# Patient Record
Sex: FEMALE | Race: BLACK OR AFRICAN AMERICAN | Employment: PART TIME | URBAN - METROPOLITAN AREA
[De-identification: names, ages, dates, MRNs, and addresses within clinical notes are randomized per-mention and may not be internally consistent; named-entity substitution may affect disease eponyms.]

---

## 2018-07-19 ENCOUNTER — ROUTINE PRENATAL (OUTPATIENT)
Dept: OBGYN CLINIC | Age: 23
End: 2018-07-19

## 2018-07-19 VITALS
BODY MASS INDEX: 24.75 KG/M2 | SYSTOLIC BLOOD PRESSURE: 92 MMHG | WEIGHT: 154 LBS | HEIGHT: 66 IN | DIASTOLIC BLOOD PRESSURE: 60 MMHG

## 2018-07-19 DIAGNOSIS — Z34.82 ENCOUNTER FOR SUPERVISION OF OTHER NORMAL PREGNANCY, SECOND TRIMESTER: ICD-10-CM

## 2018-07-19 DIAGNOSIS — N92.6 MISSED MENSES: ICD-10-CM

## 2018-07-19 DIAGNOSIS — Z34.02 ENCOUNTER FOR PRENATAL CARE IN SECOND TRIMESTER OF FIRST PREGNANCY: Primary | ICD-10-CM

## 2018-07-19 LAB
AFPT, MATERNAL, EXTERNAL: NEGATIVE
ANTIBODY SCREEN, EXTERNAL: NEGATIVE
CHLAMYDIA, EXTERNAL: NEGATIVE
HBSAG, EXTERNAL: NEGATIVE
HCT, EXTERNAL: 37.9
HGB, EXTERNAL: 12.9
HIV, EXTERNAL: NEGATIVE
N. GONORRHEA, EXTERNAL: NEGATIVE
PLATELET CNT,   EXTERNAL: 253
RUBELLA, EXTERNAL: 3.01
T. PALLIDUM, EXTERNAL: NEGATIVE
TYPE, ABO & RH, EXTERNAL: NORMAL
URINALYSIS, EXTERNAL: NEGATIVE

## 2018-07-19 NOTE — PROGRESS NOTES
Current pregnancy history:    Dorian Gee is a 25 y.o. female who presents for the evaluation of pregnancy. Patient's last menstrual period was 2018 (approximate). LMP history:  The date of her LMP is almost certain. Her last menstrual period was normal and lasted for 4 to 5 days. A urine pregnancy test was positive in April. She was not on the pill at conception. Based on her LMP, her EDC is 19 and her EGA is 15 weeks, 5 days. Her menstrual cycles are regular and occur approximately every 28 days  and range from 3 to 5 days. The last menses lasted the usual number of days. Ultrasound data:  She had an  ultrasound done by the ultrasound tech today which revealed a viable schreiber pregnancy with a gestational age of 14 weeks and 5 days giving an EDC of 19. Pregnancy symptoms:    Since her LMP she has experienced  urinary frequency, breast tenderness, and nausea. She has not been vomiting over the last few weeks. Associated signs and symptoms which she denies: dysuria, discharge, vaginal bleeding. She states she has gained weight:  Approximately a few pounds over the last few weeks. Relevant past pregnancy history:   She has the following pregnancy history Her last pregnancy was uncomplicated . She has no history of  delivery. Relevant past medical history:(relevant to this pregnancy): noncontributory. Pap/Occupational history:  Last pap smear: 2018 performed at [address] [place] [address] Results: Normal      Her occupation is: CNA. Substance history: negative for alcohol, tobacco and street drugs. Positive for nothing. Exposure history: There is/are no indoor cat/s in the home. The patient was instructed to not change the cat litter. She admits close contact with children on a regular basis. She has had chicken pox or the vaccine in the past.   Patient denies issues with domestic violence.      Genetic Screening/Teratology Counseling: (Includes patient, baby's father, or anyone in either family with:)  3.  Patient's age >/= 28 at [facility]?-- no  .   2. Thalassemia ([facility], [place], [address], or  background): MCV<80?--no.     3.  Neural tube defect (meningomyelocele, spina bifida, anencephaly)?--no.   4.  Congenital heart defect?--no.  5.  Down syndrome?--no.   6.  Sterling-Sachs ([place], [place])?--no.   7.  Canavan's Disease?--no.   8.  Familial Dysautonomia?--no.   9.  Sickle cell disease or trait ()? --no   The patient has not been tested for sickle trait  10. Hemophilia or other blood disorders?--no. 11.  Muscular dystrophy?--no. 12.  Cystic fibrosis?--no. 13.  Menominee's Chorea?--no. 14.  Mental retardation/autism (if yes was person tested for Fragile X)?--no. 15.  Other inherited genetic or chromosomal disorder?--no. 12.  Maternal metabolic disorder (DM, PKU, etc)?--no. 17.  Patient or FOB with a child with a birth defect not listed above?--no.  17a. Patient or FOB with a birth defect themselves?--no. 18.  Recurrent pregnancy loss, or stillbirth?--no. 19.  Any medications since LMP other than prenatal vitamins (include vitamins,  supplements, OTC meds, drugs, alcohol)?--no. 20.  Any other genetic/environmental exposure to discuss?--no. Infection History:  1. Lives with someone with TB or TB exposed?--no.   2.  Patient or partner has history of genital herpes?--no.  3.  Rash or viral illness since LMP?--no.    4.  History of STD (GC, CT, HPV, syphilis, HIV)? --no   5. Other: OTHER? Past Medical History:   Diagnosis Date    Asthma     Nerve damage     Psychiatric disorder     anxiety     Past Surgical History:   Procedure Laterality Date    HX OTHER SURGICAL      keloid x3 removal on ear     Social History     Occupational History    Not on file.      Social History Main Topics    Smoking status: Never Smoker    Smokeless tobacco: Never Used    Alcohol use No    Drug use: No    Sexual activity: Yes     Partners: Male     Birth control/ protection: None     History reviewed. No pertinent family history. No Known Allergies  Prior to Admission medications    Medication Sig Start Date End Date Taking? Authorizing Provider   HYDROcodone-acetaminophen (NORCO) 5-325 mg per tablet Take 1-2 Tabs by mouth every four (4) hours as needed for Pain. Max Daily Amount: 12 Tabs. 9/28/16   Giovani Meneses MD   ondansetron (ZOFRAN ODT) 4 mg disintegrating tablet Take 1 Tab by mouth every four (4) hours as needed for Nausea. 9/28/16   Giovani Meneses MD   methylPREDNISolone (MEDROL DOSEPACK) 4 mg tablet As directed 9/28/16   Giovani Meneses MD   FLUoxetine (PROZAC) 20 mg capsule Take 20 mg by mouth daily. Historical Provider   loratadine 10 mg Cap Take  by mouth daily.  10 mg daily     Phys MD Gallo        Review of Systems: History obtained from the patient  Constitutional: negative for weight loss, fever, night sweats  HEENT: negative for hearing loss, earache, congestion, snoring, sorethroat  CV: negative for chest pain, palpitations, edema  Resp: negative for cough, shortness of breath, wheezing  Breast: negative for breast lumps, nipple discharge, galactorrhea  GI: negative for change in bowel habits, abdominal pain, black or bloody stools  : negative for frequency, dysuria, hematuria, vaginal discharge  MSK: negative for back pain, joint pain, muscle pain  Skin: negative for itching, rash, hives  Neuro: negative for dizziness, headache, confusion, weakness  Psych: negative for anxiety, depression, change in mood  Heme/lymph: negative for bleeding, bruising, pallor    Objective:  Visit Vitals    BP 92/60    Ht 5' 6\" (1.676 m)    Wt 154 lb (69.9 kg)    LMP 03/31/2018 (Approximate)    BMI 24.86 kg/m2       Physical Exam:   PHYSICAL EXAMINATION    Constitutional  · Appearance: well-nourished, well developed, alert, in no acute distress    HENT  · Head  · Face: appears normal  · Eyes: appear normal  · Ears: normal  · Mouth: normal  · Lips: no lesions    Neck  · Inspection/Palpation: normal appearance, no masses or tenderness  · Lymph Nodes: no lymphadenopathy present  · Thyroid: gland size normal, nontender, no nodules or masses present on palpation    Chest  · Respiratory Effort: breathing unlabored  · Auscultation: normal breath sounds    Cardiovascular  · Heart:  · Auscultation: regular rate and rhythm without murmur    Breasts  · Inspection of Breasts: breasts symmetrical, no skin changes, no discharge present, nipple appearance normal, no skin retraction present  · Palpation of Breasts and Axillae: no masses present on palpation, no breast tenderness  · Axillary Lymph Nodes: no lymphadenopathy present    Gastrointestinal  · Abdominal Examination: abdomen non-tender to palpation, normal bowel sounds, no masses present  · Liver and spleen: no hepatomegaly present, spleen not palpable  · Hernias: no hernias identified    Genitourinary  · External Genitalia: normal appearance for age, no discharge present, no tenderness present, no inflammatory lesions present, no masses present, no atrophy present  · Vagina: normal vaginal vault without central or paravaginal defects, no discharge present, no inflammatory lesions present, no masses present  · Bladder: non-tender to palpation  · Urethra: appears normal  · Cervix: normal   · Uterus: enlarged to 16 week size, normal shape, soft  · Adnexa: no adnexal tenderness present, no adnexal masses present  · Perineum: perineum within normal limits, no evidence of trauma, no rashes or skin lesions present  · Anus: anus within normal limits, no hemorrhoids present  · Inguinal Lymph Nodes: no lymphadenopathy present    Skin  · General Inspection: no rash, no lesions identified    Neurologic/Psychiatric  · Mental Status:  · Orientation: grossly oriented to person, place and time  · Mood and Affect: mood normal, affect appropriate    Assessment:   Intrauterine pregnancy with the following problems identified: Lived in [facility] for 4 months during this pregnancy--no illness. Plan:     Offered CF testing, CVS, Nuchal Translucency, MSAFP, amnio  Course of pregnancy discussed including visit schedule, routine U/S, glucola testing, etc.  Avoid alcoholic beverages and illicit/recreational drugs use  Take prenatal vitamins or folic acid daily. Hospital and practice style discussed with coverage system. Discussed nutrition, toxoplasmosis precautions, sexual activity, exercise, need for influenza vaccine, environmental and work hazards, travel advice, screen for domestic violence, need for seat belts. Discussed seafood, unpasteurized dairy products, deli meat, artificial sweeteners, and caffeine. Information on prenatal classes/breastfeeding given. Information on circumcision given  Patient encouraged not to smoke. Discussed current prescription drug use. Given medication list.  Discussed the use of over the counter medications and chemicals. Route of delivery discussed, including risks, benefits, and alternatives of  versus repeat LTCS. Pt understands risk of hemorrhage during pregnancy and post delivery and would accept blood products if necessary in life-threatening emergencies      Handouts given to pt.

## 2018-07-21 LAB — BACTERIA UR CULT: NO GROWTH

## 2018-07-23 ENCOUNTER — TELEPHONE (OUTPATIENT)
Dept: OBGYN CLINIC | Age: 23
End: 2018-07-23

## 2018-07-23 RX ORDER — ONDANSETRON 8 MG/1
8 TABLET, ORALLY DISINTEGRATING ORAL
Qty: 30 TAB | Refills: 2 | Status: SHIPPED | OUTPATIENT
Start: 2018-07-23 | End: 2018-12-28

## 2018-07-23 NOTE — TELEPHONE ENCOUNTER
Patient left VM on OMAR Triage line requesting a rx for nausea and vomiting in pregnancy. Patient is  , 16w2d  Ob patient. Pharmacy is correct. Please advise.

## 2018-07-24 LAB
C TRACH RRNA SPEC QL NAA+PROBE: NEGATIVE
N GONORRHOEA RRNA SPEC QL NAA+PROBE: NEGATIVE
T VAGINALIS RRNA SPEC QL NAA+PROBE: NEGATIVE

## 2018-08-01 LAB
2ND TRIMESTER 4 SCREEN SERPL-IMP: NORMAL
2ND TRIMESTER 4 SCREEN SERPL-IMP: NORMAL
ABO GROUP BLD: NORMAL
AFP ADJ MOM SERPL: 0.73
AFP SERPL-MCNC: 25.8 NG/ML
AGE AT DELIVERY: 22.3 YR
BLD GP AB SCN SERPL QL: NEGATIVE
COMMENTS, 018014: NORMAL
ERYTHROCYTE [DISTWIDTH] IN BLOOD BY AUTOMATED COUNT: 14 % (ref 12.3–15.4)
FET TS 18 RISK FROM MAT AGE: NORMAL
FET TS 21 RISK FROM MAT AGE: 1121
GA METHOD: NORMAL
GA: 16 WEEKS
HBV SURFACE AG SERPL QL IA: NEGATIVE
HCG ADJ MOM SERPL: 0.85
HCG SERPL-ACNC: NORMAL MIU/ML
HCT VFR BLD AUTO: 37.9 % (ref 34–46.6)
HGB BLD-MCNC: 12.9 G/DL (ref 11.1–15.9)
HIV 1+2 AB+HIV1 P24 AG SERPL QL IA: NON REACTIVE
IDDM PATIENT QL: NO
INHIBIN A ADJ MOM SERPL: 0.9
INHIBIN A SERPL-MCNC: 150.65 PG/ML
MCH RBC QN AUTO: 31.1 PG (ref 26.6–33)
MCHC RBC AUTO-ENTMCNC: 34 G/DL (ref 31.5–35.7)
MCV RBC AUTO: 91 FL (ref 79–97)
MULTIPLE PREGNANCY: NO
NEURAL TUBE DEFECT RISK FETUS: NORMAL %
PLATELET # BLD AUTO: 253 X10E3/UL (ref 150–379)
RBC # BLD AUTO: 4.15 X10E6/UL (ref 3.77–5.28)
RESULTS, 017389: NORMAL
RH BLD: POSITIVE
RUBV IGG SERPL IA-ACNC: 3.01 INDEX
T PALLIDUM AB SER QL IA: NEGATIVE
TS 18 RISK FETUS: NORMAL
TS 21 RISK FETUS: 6686
U ESTRIOL ADJ MOM SERPL: 0.97
U ESTRIOL SERPL-MCNC: 0.74 NG/ML
WBC # BLD AUTO: 8.6 X10E3/UL (ref 3.4–10.8)

## 2018-08-02 DIAGNOSIS — Z34.82 ENCOUNTER FOR SUPERVISION OF OTHER NORMAL PREGNANCY, SECOND TRIMESTER: ICD-10-CM

## 2018-08-14 ENCOUNTER — ROUTINE PRENATAL (OUTPATIENT)
Dept: OBGYN CLINIC | Age: 23
End: 2018-08-14

## 2018-08-14 VITALS
SYSTOLIC BLOOD PRESSURE: 100 MMHG | WEIGHT: 158 LBS | DIASTOLIC BLOOD PRESSURE: 60 MMHG | HEIGHT: 66 IN | BODY MASS INDEX: 25.39 KG/M2

## 2018-08-14 DIAGNOSIS — Z34.82 ENCOUNTER FOR SUPERVISION OF OTHER NORMAL PREGNANCY, SECOND TRIMESTER: Primary | ICD-10-CM

## 2018-09-11 ENCOUNTER — ROUTINE PRENATAL (OUTPATIENT)
Dept: OBGYN CLINIC | Age: 23
End: 2018-09-11

## 2018-09-11 VITALS
SYSTOLIC BLOOD PRESSURE: 98 MMHG | WEIGHT: 163 LBS | DIASTOLIC BLOOD PRESSURE: 60 MMHG | HEIGHT: 66 IN | BODY MASS INDEX: 26.2 KG/M2

## 2018-09-11 DIAGNOSIS — Z34.82 ENCOUNTER FOR SUPERVISION OF OTHER NORMAL PREGNANCY, SECOND TRIMESTER: Primary | ICD-10-CM

## 2018-09-11 NOTE — PATIENT INSTRUCTIONS
Weeks 22 to 26 of Your Pregnancy: Care Instructions  Your Care Instructions    As you enter your 7th month of pregnancy at week 26, your baby's lungs are growing stronger and getting ready to breathe. You may notice that your baby responds to the sound of your or your partner's voice. You may also notice that your baby does less turning and twisting and more squirming or jerking. Jerking often means that your baby has the hiccups. Hiccups are perfectly normal and are only temporary. You may want to think about attending a childbirth preparation class. This is also a good time to start thinking about whether you want to have pain medicine during labor. Most pregnant women are tested for gestational diabetes between weeks 25 and 28. Gestational diabetes occurs when your blood sugar level gets too high when you're pregnant. The test is important, because you can have gestational diabetes and not know it. But the condition can cause problems for your baby. Follow-up care is a key part of your treatment and safety. Be sure to make and go to all appointments, and call your doctor if you are having problems. It's also a good idea to know your test results and keep a list of the medicines you take. How can you care for yourself at home? Ease discomfort from your baby's kicking  · Change your position. Sometimes this will cause your baby to change position too. · Take a deep breath while you raise your arm over your head. Then breathe out while you drop your arm. Do Kegel exercises to prevent urine from leaking  · You can do Kegel exercises while you stand or sit. ¨ Squeeze the same muscles you would use to stop your urine. Your belly and thighs should not move. ¨ Hold the squeeze for 3 seconds, and then relax for 3 seconds. ¨ Start with 3 seconds. Then add 1 second each week until you are able to squeeze for 10 seconds. ¨ Repeat the exercise 10 to 15 times for each session.  Do three or more sessions each day.  Ease or reduce swelling in your feet, ankles, hands, and fingers  · If your fingers are puffy, take off your rings. · Do not eat high-salt foods, such as potato chips. · Prop up your feet on a stool or couch as much as possible. Sleep with pillows under your feet. · Do not stand for long periods of time or wear tight shoes. · Wear support stockings. Where can you learn more? Go to http://alberto-brady.info/. Enter G264 in the search box to learn more about \"Weeks 22 to 26 of Your Pregnancy: Care Instructions. \"  Current as of: November 21, 2017  Content Version: 11.7  © 9164-9907 [employer]. Care instructions adapted under license by [employer] (which disclaims liability or warranty for this information). If you have questions about a medical condition or this instruction, always ask your healthcare professional. [place] [address] any warranty or liability for your use of this information.

## 2018-09-25 ENCOUNTER — ROUTINE PRENATAL (OUTPATIENT)
Dept: OBGYN CLINIC | Age: 23
End: 2018-09-25

## 2018-09-25 VITALS
DIASTOLIC BLOOD PRESSURE: 60 MMHG | BODY MASS INDEX: 26.03 KG/M2 | HEIGHT: 66 IN | SYSTOLIC BLOOD PRESSURE: 100 MMHG | WEIGHT: 162 LBS

## 2018-09-25 DIAGNOSIS — R30.0 BURNING WITH URINATION: ICD-10-CM

## 2018-09-25 DIAGNOSIS — Z34.82 ENCOUNTER FOR SUPERVISION OF OTHER NORMAL PREGNANCY, SECOND TRIMESTER: Primary | ICD-10-CM

## 2018-09-25 LAB
BILIRUB UR QL STRIP: NEGATIVE
GLUCOSE UR-MCNC: NEGATIVE MG/DL
KETONES P FAST UR STRIP-MCNC: NEGATIVE MG/DL
PH UR STRIP: 7 [PH] (ref 4.6–8)
PROT UR QL STRIP: NEGATIVE
SP GR UR STRIP: 1 (ref 1–1.03)
UA UROBILINOGEN AMB POC: NORMAL (ref 0.2–1)
URINALYSIS CLARITY POC: NORMAL
URINALYSIS COLOR POC: NORMAL
URINE BLOOD POC: NEGATIVE
URINE LEUKOCYTES POC: NEGATIVE
URINE NITRITES POC: NEGATIVE

## 2018-09-25 RX ORDER — NITROFURANTOIN 25; 75 MG/1; MG/1
100 CAPSULE ORAL 2 TIMES DAILY
Qty: 14 CAP | Refills: 0 | Status: SHIPPED | OUTPATIENT
Start: 2018-09-25 | End: 2018-12-28

## 2018-09-25 NOTE — PROGRESS NOTES
UTI note    Misael Rivera is a ,  26 y.o. female [address] who presents today with had concerns including UTI. Heather Nicolas Her symptoms started a few days ago, unchanged since that time. Discomfort is in the suprapubic area and does not radiate. Symptoms are not alleviated by hydration. Symptoms are exacerbated with activity. Patient's other complaints: back pain and urinary frequency with burning. Her symptoms are moderate. Patient denies fever and vaginal discharge. There is not any concern of sexual abuse. There is not a history of trauma to the genital area. Patient does not have a history of recurrent UTI. She does not have a history of pyelonephritis. She has a history of  has a past medical history of Asthma and Psychiatric disorder. with the following surgical history  has a past surgical history that includes hx other surgical..  . She has not been evaluated for her current complaints. Urine dipstick shows negative for all components. Also having reflux. No results found for this or any previous visit. Past Medical History:   Diagnosis Date    Asthma     Psychiatric disorder     anxiety     Past Surgical History:   Procedure Laterality Date    HX OTHER SURGICAL      keloid x3 removal on ear     Social History     Occupational History    Not on file. Social History Main Topics    Smoking status: Never Smoker    Smokeless tobacco: Never Used    Alcohol use No    Drug use: No    Sexual activity: Yes     Partners: Male     Birth control/ protection: None     History reviewed. No pertinent family history. No Known Allergies  Prior to Admission medications    Medication Sig Start Date End Date Taking? Authorizing Provider   ondansetron (ZOFRAN ODT) 8 mg disintegrating tablet Take 1 Tab by mouth every eight (8) hours as needed for Nausea.  18   Gus Rock MD   HYDROcodone-acetaminophen [facility]) 5-325 mg per tablet Take 1-2 Tabs by mouth every four (4) hours as needed for Pain. Max Daily Amount: 12 Tabs. 9/28/16   Ledy Tinsley MD   ondansetron (ZOFRAN ODT) 4 mg disintegrating tablet Take 1 Tab by mouth every four (4) hours as needed for Nausea. 9/28/16   Ledy Tinsley MD   methylPREDNISolone (MEDROL DOSEPACK) 4 mg tablet As directed 9/28/16   Ledy Tinsley MD   FLUoxetine (PROZAC) 20 mg capsule Take 20 mg by mouth daily. Historical Provider   loratadine 10 mg Cap Take  by mouth daily.  10 mg daily     Phys MD Gallo        Review of Systems: History obtained from the patient  Constitutional: negative for weight loss, fever, night sweats  Breast: negative for breast lumps, nipple discharge, galactorrhea  GI: negative for change in bowel habits, abdominal pain, black or bloody stools  : see HPI, negative for vaginal discharge  MSK: negative for back pain, joint pain, muscle pain  Skin: negative for itching, rash, hives  Psych: negative for anxiety, depression, change in mood      Objective:  Visit Vitals    /60    Ht 5' 6\" (1.676 m)    Wt 162 lb (73.5 kg)    LMP 03/31/2018 (Approximate)    BMI 26.15 kg/m2       Physical Exam:   PHYSICAL EXAMINATION    Constitutional  · Appearance: well-nourished, well developed, alert, in no acute distress  · Back: negative for CVAT    Gastrointestinal  · Abdominal Examination: abdomen non-tender to palpation, normal bowel sounds, no masses present  · Liver and spleen: no hepatomegaly present, spleen not palpable  · Hernias: no hernias identified    Skin  · General Inspection: no rash, no lesions identified    Neurologic/Psychiatric  · Mental Status:  · Orientation: grossly oriented to person, place and time  · Mood and Affect: mood normal, affect appropriate    Assessment:   UTI by symptoms  Reflux    Plan:   Rx: Macrobid, call if culture shows not sensitive  Rx: Zantac 150 bid  Glucose, TDAP, Flu, consent next appt

## 2018-09-26 LAB — BACTERIA UR CULT: NO GROWTH

## 2018-09-28 ENCOUNTER — HOSPITAL ENCOUNTER (EMERGENCY)
Age: 23
Discharge: HOME | End: 2018-09-29
Attending: EMERGENCY MEDICINE
Payer: MEDICAID

## 2018-09-28 DIAGNOSIS — T78.40XA ALLERGIC REACTION, INITIAL ENCOUNTER: Primary | ICD-10-CM

## 2018-09-28 DIAGNOSIS — Z3A.26 PREGNANCY WITH 26 COMPLETED WEEKS GESTATION: ICD-10-CM

## 2018-09-28 PROCEDURE — 81001 URINALYSIS AUTO W/SCOPE: CPT | Performed by: NURSE PRACTITIONER

## 2018-09-28 PROCEDURE — 96361 HYDRATE IV INFUSION ADD-ON: CPT

## 2018-09-28 PROCEDURE — 99283 EMERGENCY DEPT VISIT LOW MDM: CPT

## 2018-09-28 PROCEDURE — 85025 COMPLETE CBC W/AUTO DIFF WBC: CPT | Performed by: NURSE PRACTITIONER

## 2018-09-28 PROCEDURE — 36415 COLL VENOUS BLD VENIPUNCTURE: CPT | Performed by: NURSE PRACTITIONER

## 2018-09-28 PROCEDURE — 74011250636 HC RX REV CODE- 250/636: Performed by: NURSE PRACTITIONER

## 2018-09-28 PROCEDURE — 80053 COMPREHEN METABOLIC PANEL: CPT | Performed by: NURSE PRACTITIONER

## 2018-09-28 PROCEDURE — 74011636637 HC RX REV CODE- 636/637: Performed by: NURSE PRACTITIONER

## 2018-09-28 PROCEDURE — 96374 THER/PROPH/DIAG INJ IV PUSH: CPT

## 2018-09-28 RX ORDER — DIPHENHYDRAMINE HYDROCHLORIDE 50 MG/ML
12.5 INJECTION, SOLUTION INTRAMUSCULAR; INTRAVENOUS
Status: COMPLETED | OUTPATIENT
Start: 2018-09-28 | End: 2018-09-28

## 2018-09-28 RX ORDER — PREDNISONE 20 MG/1
60 TABLET ORAL
Status: COMPLETED | OUTPATIENT
Start: 2018-09-28 | End: 2018-09-28

## 2018-09-28 RX ADMIN — DIPHENHYDRAMINE HYDROCHLORIDE 12.5 MG: 50 INJECTION, SOLUTION INTRAMUSCULAR; INTRAVENOUS at 23:47

## 2018-09-28 RX ADMIN — PREDNISONE 60 MG: 20 TABLET ORAL at 23:47

## 2018-09-28 RX ADMIN — SODIUM CHLORIDE 1000 ML: 900 INJECTION, SOLUTION INTRAVENOUS at 23:47

## 2018-09-29 VITALS
TEMPERATURE: 98.8 F | WEIGHT: 163 LBS | OXYGEN SATURATION: 100 % | BODY MASS INDEX: 27.16 KG/M2 | RESPIRATION RATE: 20 BRPM | HEIGHT: 65 IN | HEART RATE: 95 BPM | SYSTOLIC BLOOD PRESSURE: 98 MMHG | DIASTOLIC BLOOD PRESSURE: 51 MMHG

## 2018-09-29 LAB
ALBUMIN SERPL-MCNC: 3 G/DL (ref 3.5–5)
ALBUMIN/GLOB SERPL: 0.8 {RATIO} (ref 1.1–2.2)
ALP SERPL-CCNC: 46 U/L (ref 45–117)
ALT SERPL-CCNC: 21 U/L (ref 12–78)
ANION GAP SERPL CALC-SCNC: 9 MMOL/L (ref 5–15)
APPEARANCE UR: CLEAR
AST SERPL-CCNC: 20 U/L (ref 15–37)
BACTERIA URNS QL MICRO: NEGATIVE /HPF
BASOPHILS # BLD: 0 K/UL (ref 0–0.1)
BASOPHILS NFR BLD: 0 % (ref 0–1)
BILIRUB SERPL-MCNC: 0.1 MG/DL (ref 0.2–1)
BILIRUB UR QL: NEGATIVE
BUN SERPL-MCNC: 10 MG/DL (ref 6–20)
BUN/CREAT SERPL: 19 (ref 12–20)
CALCIUM SERPL-MCNC: 8.9 MG/DL (ref 8.5–10.1)
CHLORIDE SERPL-SCNC: 103 MMOL/L (ref 97–108)
CO2 SERPL-SCNC: 26 MMOL/L (ref 21–32)
COLOR UR: ABNORMAL
CREAT SERPL-MCNC: 0.52 MG/DL (ref 0.55–1.02)
DIFFERENTIAL METHOD BLD: ABNORMAL
EOSINOPHIL # BLD: 0.3 K/UL (ref 0–0.4)
EOSINOPHIL NFR BLD: 5 % (ref 0–7)
EPITH CASTS URNS QL MICRO: ABNORMAL /LPF
ERYTHROCYTE [DISTWIDTH] IN BLOOD BY AUTOMATED COUNT: 12.9 % (ref 11.5–14.5)
GLOBULIN SER CALC-MCNC: 3.7 G/DL (ref 2–4)
GLUCOSE SERPL-MCNC: 96 MG/DL (ref 65–100)
GLUCOSE UR STRIP.AUTO-MCNC: NEGATIVE MG/DL
HCT VFR BLD AUTO: 33.2 % (ref 35–47)
HGB BLD-MCNC: 11.3 G/DL (ref 11.5–16)
HGB UR QL STRIP: NEGATIVE
HYALINE CASTS URNS QL MICRO: ABNORMAL /LPF (ref 0–5)
IMM GRANULOCYTES # BLD: 0 K/UL (ref 0–0.04)
IMM GRANULOCYTES NFR BLD AUTO: 0 % (ref 0–0.5)
KETONES UR QL STRIP.AUTO: NEGATIVE MG/DL
LEUKOCYTE ESTERASE UR QL STRIP.AUTO: ABNORMAL
LYMPHOCYTES # BLD: 1.5 K/UL (ref 0.8–3.5)
LYMPHOCYTES NFR BLD: 22 % (ref 12–49)
MCH RBC QN AUTO: 32.8 PG (ref 26–34)
MCHC RBC AUTO-ENTMCNC: 34 G/DL (ref 30–36.5)
MCV RBC AUTO: 96.2 FL (ref 80–99)
MONOCYTES # BLD: 0.5 K/UL (ref 0–1)
MONOCYTES NFR BLD: 7 % (ref 5–13)
NEUTS SEG # BLD: 4.5 K/UL (ref 1.8–8)
NEUTS SEG NFR BLD: 65 % (ref 32–75)
NITRITE UR QL STRIP.AUTO: NEGATIVE
NRBC # BLD: 0 K/UL (ref 0–0.01)
NRBC BLD-RTO: 0 PER 100 WBC
PH UR STRIP: 6 [PH] (ref 5–8)
PLATELET # BLD AUTO: 231 K/UL (ref 150–400)
PMV BLD AUTO: 10 FL (ref 8.9–12.9)
POTASSIUM SERPL-SCNC: 3.6 MMOL/L (ref 3.5–5.1)
PROT SERPL-MCNC: 6.7 G/DL (ref 6.4–8.2)
PROT UR STRIP-MCNC: NEGATIVE MG/DL
RBC # BLD AUTO: 3.45 M/UL (ref 3.8–5.2)
RBC #/AREA URNS HPF: ABNORMAL /HPF (ref 0–5)
SODIUM SERPL-SCNC: 138 MMOL/L (ref 136–145)
SP GR UR REFRACTOMETRY: 1.01 (ref 1–1.03)
UR CULT HOLD, URHOLD: NORMAL
UROBILINOGEN UR QL STRIP.AUTO: 0.2 EU/DL (ref 0.2–1)
WBC # BLD AUTO: 7 K/UL (ref 3.6–11)
WBC URNS QL MICRO: ABNORMAL /HPF (ref 0–4)

## 2018-09-29 RX ORDER — DIPHENHYDRAMINE HCL 25 MG
25 CAPSULE ORAL
Qty: 12 CAP | Refills: 0 | Status: SHIPPED | OUTPATIENT
Start: 2018-09-29 | End: 2018-10-09

## 2018-09-29 NOTE — ED NOTES
PT reports feeling much better. Appears better. Provided with DC instructions by provider. Verbalized understanding. PT calling mother for transportation home

## 2018-09-29 NOTE — ED PROVIDER NOTES
HPI Comments: Conrad Kruger is a 26 y.o. female without any relevant PMhx who presents via EMS to [facility] ED with cc of allergic reaction. Pt reports that she was eating something w/ pecans in it around 2200 tonight. She has walnut allergy. Reports that she had tingling/ burning in her tongue w/ facial swelling and flushing after eating the pecan. Pt took 37 mg of liquid Benadryl around 2230. Denies any SOB, rash, sore throat, or wheezing. She states that she currently only has tongue numbness/ burning in addition to lip burning/ numbness. Pt also notes that she is 26wks pregnant. Describes this as an unremarkable pregnancy for any medical concerns. No F/C, vaginal bleeding, leakage of vaginal fluids, N/V/D, cough, congestion, CP, SOB, dysuria, urinary frequency/hesitancy, flank pain. PCP: GERARDO Hyde There are no other complaints, changes or physical findings at this time. The history is provided by the patient. Past Medical History:  
Diagnosis Date  Asthma  Psychiatric disorder   
 anxiety Past Surgical History:  
Procedure Laterality Date  HX OTHER SURGICAL    
 keloid x3 removal on ear History reviewed. No pertinent family history. Social History Social History  Marital status: SINGLE Spouse name: N/A  
 Number of children: N/A  
 Years of education: N/A Occupational History  Not on file. Social History Main Topics  Smoking status: Never Smoker  Smokeless tobacco: Never Used  Alcohol use No  
 Drug use: No  
 Sexual activity: Yes  
  Partners: Male Birth control/ protection: None Other Topics Concern  Not on file Social History Narrative ALLERGIES: Pecan nut and [facility] Review of Systems Constitutional: Negative for activity change, appetite change, chills and fever. HENT: Positive for facial swelling. Negative for congestion, rhinorrhea, sinus pressure, sneezing and sore throat. Mouth numbness/ burning/ tingling Eyes: Negative for pain, discharge and visual disturbance. Respiratory: Negative for cough and shortness of breath. Cardiovascular: Negative for chest pain. Gastrointestinal: Negative for abdominal pain, diarrhea, nausea and vomiting. Genitourinary: Negative for dysuria, flank pain, frequency and urgency. Musculoskeletal: Negative for arthralgias, back pain, gait problem, joint swelling, myalgias and neck pain. Skin: Negative for color change and rash. Neurological: Negative for dizziness, speech difficulty, weakness, light-headedness, numbness and headaches. Psychiatric/Behavioral: Negative for agitation, behavioral problems and confusion. All other systems reviewed and are negative. Vitals:  
 09/28/18 2328 09/29/18 0000 09/29/18 0015 09/29/18 0030 BP: 104/58 109/58  98/51 Pulse: 95 Resp: 20 Temp: 98.8 °F (37.1 °C) SpO2: 99%  100% 100% Weight: 73.9 kg (163 lb) Height: 5' 5\" (1.651 m) Physical Exam  
Constitutional: She is oriented to person, place, and time. She appears well-developed and well-nourished. No distress. HENT:  
Head: Normocephalic and atraumatic. Right Ear: External ear normal.  
Left Ear: External ear normal.  
Nose: Nose normal.  
Mouth/Throat: Oropharynx is clear and moist.  
Eyes: Conjunctivae and EOM are normal. Pupils are equal, round, and reactive to light. Neck: Normal range of motion. Neck supple. Cardiovascular: Normal rate, regular rhythm, normal heart sounds and intact distal pulses. Pulmonary/Chest: Effort normal and breath sounds normal.  
Abdominal: Soft. Musculoskeletal: Normal range of motion. Neurological: She is alert and oriented to person, place, and time. Skin: Skin is warm and dry. Psychiatric: She has a normal mood and affect. Her behavior is normal. Judgment and thought content normal.  
Nursing note and vitals reviewed.  
  
 
LAURENCE 
 Number of Diagnoses or Management Options Allergic reaction, initial encounter:  
Pregnancy with 26 completed weeks gestation:  
Diagnosis management comments: DDx: allergic reaction, food allergy 25 yo F who is 26w pregnant w/ concern for allergic rxn. No anaphylaxis s/sx present. Pt speaking in fluid sentences w/o difficulty- no wheezing. Given Benadryl, Prednisone in ED w/o any worsening/worrisome s/sx. Labs reassuring. FHT WNL. Advised no tree nuts, take Benadryl as needed for s/sx. Call 911 for any worsening/worrisome s/sx. Amount and/or Complexity of Data Reviewed Clinical lab tests: ordered and reviewed Review and summarize past medical records: yes ED Course Procedures LABORATORY TESTS: 
Recent Results (from the past 12 hour(s)) CBC WITH AUTOMATED DIFF Collection Time: 09/28/18 11:38 PM  
Result Value Ref Range WBC 7.0 3.6 - 11.0 K/uL  
 RBC 3.45 (L) 3.80 - 5.20 M/uL  
 HGB 11.3 (L) 11.5 - 16.0 g/dL HCT 33.2 (L) 35.0 - 47.0 % MCV 96.2 80.0 - 99.0 FL  
 MCH 32.8 26.0 - 34.0 PG  
 MCHC 34.0 30.0 - 36.5 g/dL  
 RDW 12.9 11.5 - 14.5 % PLATELET 575 076 - 179 K/uL MPV 10.0 8.9 - 12.9 FL  
 NRBC 0.0 0  WBC ABSOLUTE NRBC 0.00 0.00 - 0.01 K/uL NEUTROPHILS 65 32 - 75 % LYMPHOCYTES 22 12 - 49 % MONOCYTES 7 5 - 13 % EOSINOPHILS 5 0 - 7 % BASOPHILS 0 0 - 1 % IMMATURE GRANULOCYTES 0 0.0 - 0.5 % ABS. NEUTROPHILS 4.5 1.8 - 8.0 K/UL  
 ABS. LYMPHOCYTES 1.5 0.8 - 3.5 K/UL  
 ABS. MONOCYTES 0.5 0.0 - 1.0 K/UL  
 ABS. EOSINOPHILS 0.3 0.0 - 0.4 K/UL  
 ABS. BASOPHILS 0.0 0.0 - 0.1 K/UL  
 ABS. IMM. GRANS. 0.0 0.00 - 0.04 K/UL  
 DF AUTOMATED METABOLIC PANEL, COMPREHENSIVE Collection Time: 09/28/18 11:38 PM  
Result Value Ref Range Sodium 138 136 - 145 mmol/L Potassium 3.6 3.5 - 5.1 mmol/L Chloride 103 97 - 108 mmol/L  
 CO2 26 21 - 32 mmol/L Anion gap 9 5 - 15 mmol/L Glucose 96 65 - 100 mg/dL  BUN 10 6 - 20 MG/DL  
 Creatinine 0.52 (L) 0.55 - 1.02 MG/DL  
 BUN/Creatinine ratio 19 12 - 20 GFR est AA >60 >60 ml/min/1.73m2 GFR est non-AA >60 >60 ml/min/1.73m2 Calcium 8.9 8.5 - 10.1 MG/DL Bilirubin, total 0.1 (L) 0.2 - 1.0 MG/DL  
 ALT (SGPT) 21 12 - 78 U/L  
 AST (SGOT) 20 15 - 37 U/L Alk. phosphatase 46 45 - 117 U/L Protein, total 6.7 6.4 - 8.2 g/dL Albumin 3.0 (L) 3.5 - 5.0 g/dL Globulin 3.7 2.0 - 4.0 g/dL A-G Ratio 0.8 (L) 1.1 - 2.2 URINALYSIS W/MICROSCOPIC Collection Time: 09/28/18 11:38 PM  
Result Value Ref Range Color YELLOW/STRAW Appearance CLEAR CLEAR Specific gravity 1.012 1.003 - 1.030    
 pH (UA) 6.0 5.0 - 8.0 Protein NEGATIVE  NEG mg/dL Glucose NEGATIVE  NEG mg/dL Ketone NEGATIVE  NEG mg/dL Bilirubin NEGATIVE  NEG Blood NEGATIVE  NEG Urobilinogen 0.2 0.2 - 1.0 EU/dL Nitrites NEGATIVE  NEG Leukocyte Esterase MODERATE (A) NEG    
 WBC 5-10 0 - 4 /hpf  
 RBC 0-5 0 - 5 /hpf Epithelial cells FEW FEW /lpf Bacteria NEGATIVE  NEG /hpf Hyaline cast 0-2 0 - 5 /lpf URINE CULTURE HOLD SAMPLE Collection Time: 09/28/18 11:38 PM  
Result Value Ref Range Urine culture hold URINE ON HOLD IN MICROBIOLOGY DEPT FOR 3 DAYS. IF UNPRESERVED URINE IS SUBMITTED, IT CANNOT BE USED FOR ADDITIONAL TESTING AFTER 24 HRS, RECOLLECTION WILL BE REQUIRED. IMAGING RESULTS: 
No orders to display MEDICATIONS GIVEN: 
Medications  
predniSONE (DELTASONE) tablet 60 mg (60 mg Oral Given 9/28/18 2347) diphenhydrAMINE (BENADRYL) injection 12.5 mg (12.5 mg IntraVENous Given 9/28/18 2347)  
sodium chloride 0.9 % bolus infusion 1,000 mL (0 mL IntraVENous IV Completed 9/29/18 0057) IMPRESSION: 
1. Allergic reaction, initial encounter 2. Pregnancy with 26 completed weeks gestation PLAN: 
1. Discharge Medication List as of 9/29/2018 12:41 AM  
  
START taking these medications Details diphenhydrAMINE (BENADRYL) 25 mg capsule Take 1 Cap by mouth every six (6) hours as needed for up to 10 days. , Print, Disp-12 Cap, R-0  
  
  
CONTINUE these medications which have NOT CHANGED Details  
nitrofurantoin, macrocrystal-monohydrate, (MACROBID) 100 mg capsule Take 1 Cap by mouth two (2) times a day., Normal, Disp-14 Cap, R-0  
  
ondansetron (ZOFRAN ODT) 8 mg disintegrating tablet Take 1 Tab by mouth every eight (8) hours as needed for Nausea., Normal, Disp-30 Tab, R-2  
  
FLUoxetine (PROZAC) 20 mg capsule Take 20 mg by mouth daily. , Historical Med  
  
loratadine 10 mg Cap Take  by mouth daily. 10 mg daily , Historical Med  
  
  
STOP taking these medications HYDROcodone-acetaminophen (NORCO) 5-325 mg per tablet Comments:  
Reason for Stopping:   
   
 methylPREDNISolone (MEDROL DOSEPACK) 4 mg tablet Comments:  
Reason for Stoppin.  
Follow-up Information Follow up With Details Comments Contact Info Sheela Aguilar MD Schedule an appointment as soon as possible for a visit  [address] [place] [address] [address] [place] [address] 
[phone] [facility] EMERGENCY DEPT Go to As needed, If symptoms worsen [address] [place] [address] 
[phone] 3. Return to ED if worse Discharge Note: The patient is ready for discharge. The patient's signs, symptoms, diagnosis, and discharge instruction have been discussed and the patient has conveyed their understanding. The patient is to follow up as recommended or return to the ER should their symptoms worsen. Plan has been discussed and the patient is in agreement.  
 
Doroteo Gaucher, NP

## 2018-09-29 NOTE — DISCHARGE INSTRUCTIONS
Weeks 26 to 30 of Your Pregnancy: Care Instructions  Your Care Instructions    You are now in your last trimester of pregnancy. Your baby is growing rapidly. And you'll probably feel your baby moving around more often. Your doctor may ask you to count your baby's kicks. Your back may ache as your body gets used to your baby's size and length. If you haven't already had the Tdap shot during this pregnancy, talk to your doctor about getting it. It will help protect your  against pertussis infection. During this time, it's important to take care of yourself and pay attention to what your body needs. If you feel sexual, explore ways to be close with your partner that match your comfort and desire. Use the tips provided in this care sheet to find ways to be sexual in your own way. Follow-up care is a key part of your treatment and safety. Be sure to make and go to all appointments, and call your doctor if you are having problems. It's also a good idea to know your test results and keep a list of the medicines you take. How can you care for yourself at home? Take it easy at work  · Take frequent breaks. If possible, stop working when you are tired, and rest during your lunch hour. · Take bathroom breaks every 2 hours. · Change positions often. If you sit for long periods, stand up and walk around. · When you stand for a long time, keep one foot on a low stool with your knee bent. After standing a lot, sit with your feet up. · Avoid fumes, chemicals, and tobacco smoke. Be sexual in your own way  · Having sex during pregnancy is okay, unless your doctor tells you not to. · You may be very interested in sex, or you may have no interest at all. · Your growing belly can make it hard to find a good position during intercourse. [place] and explore. · You may get cramps in your uterus when your partner touches your breasts.   · A back rub may relieve the backache or cramps that sometimes follow orgasm. Learn about  labor  · Watch for signs of  labor. You may be going into labor if:  ¨ You have menstrual-like cramps, with or without nausea. ¨ You have about 6 or more contractions in 1 hour, even after you have had a glass of water and are resting. ¨ You have a low, dull backache that does not go away when you change your position. ¨ You have pain or pressure in your pelvis that comes and goes in a pattern. ¨ You have intestinal cramping or flu-like symptoms, with or without diarrhea. ¨ You notice an increase or change in your vaginal discharge. Discharge may be heavy, mucus-like, watery, or streaked with blood. ¨ Your water breaks. · If you think you have  labor:  ¨ Drink 2 or 3 glasses of water or juice. Not drinking enough fluids can cause contractions. ¨ Stop what you are doing, and empty your bladder. Then lie down on your left side for at least 1 hour. ¨ While lying on your side, find your breast bone. Put your fingers in the soft spot just below it. Move your fingers down toward your belly button to find the top of your uterus. Check to see if it is tight. ¨ Contractions can be weak or strong. Record your contractions for an hour. Time a contraction from the start of one contraction to the start of the next one. ¨ Single or several strong contractions without a pattern are called Sal[place]-Brooks contractions. They are practice contractions but not the start of labor. They often stop if you change what you are doing. ¨ Call your doctor if you have regular contractions. Where can you learn more? Go to http://alberto-brady.info/. Enter T137 in the search box to learn more about \"Weeks 26 to 30 of Your Pregnancy: Care Instructions. \"  Current as of: 2017  Content Version: 11.7  © 4323-0370 [employer]. Care instructions adapted under license by [employer] (which disclaims liability or warranty for this information). If you have questions about a medical condition or this instruction, always ask your healthcare professional. [address] any warranty or liability for your use of this information. Allergic Reaction: Care Instructions  Your Care Instructions    An allergic reaction is an excessive response from your immune system to a medicine, chemical, food, insect bite, or other substance. A reaction can range from mild to life-threatening. Some people have a mild rash, hives, and itching or stomach cramps. In severe reactions, swelling of your tongue and throat can close up your airway so that you cannot breathe. Follow-up care is a key part of your treatment and safety. Be sure to make and go to all appointments, and call your doctor if you are having problems. It's also a good idea to know your test results and keep a list of the medicines you take. How can you care for yourself at home? · If you know what caused your allergic reaction, be sure to avoid it. Your allergy may become more severe each time you have a reaction. · Take an over-the-counter antihistamine, such as cetirizine (Zyrtec) or loratadine (Claritin), to treat mild symptoms. Read and follow directions on the label. Some antihistamines can make you feel sleepy. Do not give antihistamines to a child unless you have checked with your doctor first. Mild symptoms include sneezing or an itchy or runny nose; an itchy mouth; a few hives or mild itching; and mild nausea or stomach discomfort. · Do not scratch hives or a rash. Put a cold, moist towel on them or take cool baths to relieve itching. Put ice packs on hives, swelling, or insect stings for 10 to 15 minutes at a time. Put a thin cloth between the ice pack and your skin. Do not take hot baths or showers. They will make the itching worse. · Your doctor may prescribe a shot of epinephrine to carry with you in case you have a severe reaction.  Learn how to give yourself the shot and keep it with you at all times. Make sure it is not . · Go to the emergency room every time you have a severe reaction, even if you have used your shot of epinephrine and are feeling better. Symptoms can come back after a shot. · Wear medical alert jewelry that lists your allergies. You can buy this at most drugstores. · If your child has a severe allergy, make sure that his or her teachers, babysitters, coaches, and other caregivers know about the allergy. They should have an epinephrine shot, know how and when to give it, and have a plan to take your child to the hospital.  When should you call for help? Give an epinephrine shot if:    · You think you are having a severe allergic reaction.     · You have symptoms in more than one body area, such as mild nausea and an itchy mouth.    After giving an epinephrine shot call 911, even if you feel better.   Call 911 if:    · You have symptoms of a severe allergic reaction. These may include:  ¨ Sudden raised, red areas (hives) all over your body. ¨ Swelling of the throat, mouth, lips, or tongue. ¨ Trouble breathing. ¨ Passing out (losing consciousness). Or you may feel very lightheaded or suddenly feel weak, confused, or restless.     · You have been given an epinephrine shot, even if you feel better.    Call your doctor now or seek immediate medical care if:    · You have symptoms of an allergic reaction, such as:  ¨ A rash or hives (raised, red areas on the skin). ¨ Itching. ¨ Swelling. ¨ Belly pain, nausea, or vomiting.    Watch closely for changes in your health, and be sure to contact your doctor if:    · You do not get better as expected. Where can you learn more? Go to http://alberto-brady.info/. Enter W099 in the search box to learn more about \"Allergic Reaction: Care Instructions. \"  Current as of: 2017  Content Version: 11.7  © 9755-7209 [employer].  Care instructions adapted under license by [employer] (which disclaims liability or warranty for this information). If you have questions about a medical condition or this instruction, always ask your healthcare professional. [address] any warranty or liability for your use of this information.

## 2018-10-01 ENCOUNTER — TELEPHONE (OUTPATIENT)
Dept: OBGYN CLINIC | Age: 23
End: 2018-10-01

## 2018-10-01 NOTE — TELEPHONE ENCOUNTER
Dr. Duong Medrano.  [facility] (gastrointerologist)        [facility] [id number] [phone]

## 2018-10-01 NOTE — TELEPHONE ENCOUNTER
Patient of [facility], 26 w 2 day gestation. She is calling because she is a  and had to have a severe tear and episiotomy done. Ever since then, she has had problems with stool incontinence. Solid or liquidy stool. Patient said that this condition is getting worse. She wants to know what specialist does she need to see for this issue? You, GI or general surgeon?

## 2018-10-08 ENCOUNTER — TELEPHONE (OUTPATIENT)
Dept: OBGYN CLINIC | Age: 23
End: 2018-10-08

## 2018-10-08 NOTE — TELEPHONE ENCOUNTER
Patient calling , 27w2d pregnant, states she is having her 1 hour glucose testing done tomorrow and wanted to know what to eat. Patient advised to eat light, nothing too sugary and not to drink anything too sugary as well. Patient given suggestions as scrambled eggs, 1 piece of toast and water. Patient verbalized understanding.

## 2018-10-09 ENCOUNTER — ROUTINE PRENATAL (OUTPATIENT)
Dept: OBGYN CLINIC | Age: 23
End: 2018-10-09

## 2018-10-09 VITALS — DIASTOLIC BLOOD PRESSURE: 64 MMHG | WEIGHT: 165 LBS | BODY MASS INDEX: 27.46 KG/M2 | SYSTOLIC BLOOD PRESSURE: 114 MMHG

## 2018-10-09 DIAGNOSIS — Z34.82 ENCOUNTER FOR SUPERVISION OF OTHER NORMAL PREGNANCY, SECOND TRIMESTER: Primary | ICD-10-CM

## 2018-10-09 LAB
GTT, 1 HR, GLUCOLA, EXTERNAL: 81
HCT, EXTERNAL: 33.3
HGB, EXTERNAL: 10.9
PLATELET CNT,   EXTERNAL: 232

## 2018-10-09 NOTE — PROGRESS NOTES
After obtaining consent, and per verbal order from Dr. Brock Siu, patient received influenza vaccine given by Candelaria Reilly LPN in Right Deltoid. Influenza Vaccine 0.5 mL IM now. Patient was observed for 10 minutes post injection. Patient tolerated injection well. After receiving V.O for T-Dap vaccination per Brock Siu T-Dap 0.5 ml given Left deltoid,Patient tolerated injection without adverse reaction noted, observed 10 minutes post injection.

## 2018-10-10 LAB
ERYTHROCYTE [DISTWIDTH] IN BLOOD BY AUTOMATED COUNT: 13.7 % (ref 12.3–15.4)
GLUCOSE 1H P 50 G GLC PO SERPL-MCNC: 81 MG/DL (ref 65–129)
HCT VFR BLD AUTO: 33.3 % (ref 34–46.6)
HGB BLD-MCNC: 10.9 G/DL (ref 11.1–15.9)
MCH RBC QN AUTO: 32.2 PG (ref 26.6–33)
MCHC RBC AUTO-ENTMCNC: 32.7 G/DL (ref 31.5–35.7)
MCV RBC AUTO: 99 FL (ref 79–97)
PLATELET # BLD AUTO: 232 X10E3/UL (ref 150–379)
RBC # BLD AUTO: 3.38 X10E6/UL (ref 3.77–5.28)
WBC # BLD AUTO: 6 X10E3/UL (ref 3.4–10.8)

## 2018-10-23 ENCOUNTER — ROUTINE PRENATAL (OUTPATIENT)
Dept: OBGYN CLINIC | Age: 23
End: 2018-10-23

## 2018-10-23 VITALS
SYSTOLIC BLOOD PRESSURE: 104 MMHG | HEIGHT: 65 IN | WEIGHT: 168 LBS | BODY MASS INDEX: 27.99 KG/M2 | DIASTOLIC BLOOD PRESSURE: 60 MMHG

## 2018-10-23 DIAGNOSIS — N89.8 VAGINAL IRRITATION: ICD-10-CM

## 2018-10-23 DIAGNOSIS — Z34.82 ENCOUNTER FOR SUPERVISION OF OTHER NORMAL PREGNANCY, SECOND TRIMESTER: Primary | ICD-10-CM

## 2018-10-23 NOTE — PROGRESS NOTES
Vulvar irritation and aching. Has varicosities in vulva and a tiny fissure anterior introitus.   No sign of HSV or other lesions  NS sent--call results

## 2018-10-26 LAB
C ALBICANS DNA VAG QL NAA+PROBE: NEGATIVE
C GLABRATA DNA VAG QL NAA+PROBE: NEGATIVE

## 2018-11-07 ENCOUNTER — ROUTINE PRENATAL (OUTPATIENT)
Dept: OBGYN CLINIC | Age: 23
End: 2018-11-07

## 2018-11-07 VITALS
WEIGHT: 165 LBS | DIASTOLIC BLOOD PRESSURE: 60 MMHG | HEIGHT: 65 IN | SYSTOLIC BLOOD PRESSURE: 108 MMHG | BODY MASS INDEX: 27.49 KG/M2

## 2018-11-07 DIAGNOSIS — Z34.83 ENCOUNTER FOR SUPERVISION OF OTHER NORMAL PREGNANCY, THIRD TRIMESTER: Primary | ICD-10-CM

## 2018-11-07 NOTE — PATIENT INSTRUCTIONS

## 2018-11-20 ENCOUNTER — ROUTINE PRENATAL (OUTPATIENT)
Dept: OBGYN CLINIC | Age: 23
End: 2018-11-20

## 2018-11-20 VITALS
WEIGHT: 169.2 LBS | SYSTOLIC BLOOD PRESSURE: 104 MMHG | DIASTOLIC BLOOD PRESSURE: 62 MMHG | HEIGHT: 65 IN | RESPIRATION RATE: 16 BRPM | BODY MASS INDEX: 28.19 KG/M2

## 2018-11-20 DIAGNOSIS — Z34.83 ENCOUNTER FOR SUPERVISION OF OTHER NORMAL PREGNANCY, THIRD TRIMESTER: Primary | ICD-10-CM

## 2018-12-04 ENCOUNTER — ROUTINE PRENATAL (OUTPATIENT)
Dept: OBGYN CLINIC | Age: 23
End: 2018-12-04

## 2018-12-04 VITALS
BODY MASS INDEX: 28.55 KG/M2 | SYSTOLIC BLOOD PRESSURE: 98 MMHG | WEIGHT: 171.38 LBS | DIASTOLIC BLOOD PRESSURE: 60 MMHG | HEIGHT: 65 IN

## 2018-12-04 DIAGNOSIS — Z34.83 ENCOUNTER FOR SUPERVISION OF OTHER NORMAL PREGNANCY, THIRD TRIMESTER: Primary | ICD-10-CM

## 2018-12-04 DIAGNOSIS — Z36.85 ANTENATAL SCREENING FOR STREPTOCOCCUS B: ICD-10-CM

## 2018-12-04 LAB — GRBS, EXTERNAL: NEGATIVE

## 2018-12-08 LAB — B-HEM STREP SPEC QL CULT: NEGATIVE

## 2018-12-11 ENCOUNTER — ROUTINE PRENATAL (OUTPATIENT)
Dept: OBGYN CLINIC | Age: 23
End: 2018-12-11

## 2018-12-11 VITALS
BODY MASS INDEX: 28.49 KG/M2 | HEIGHT: 65 IN | SYSTOLIC BLOOD PRESSURE: 122 MMHG | DIASTOLIC BLOOD PRESSURE: 60 MMHG | WEIGHT: 171 LBS

## 2018-12-11 DIAGNOSIS — Z34.83 ENCOUNTER FOR SUPERVISION OF OTHER NORMAL PREGNANCY, THIRD TRIMESTER: Primary | ICD-10-CM

## 2018-12-11 NOTE — PATIENT INSTRUCTIONS
Weeks 34 to 36 of Your Pregnancy: Care Instructions  Your Care Instructions    By now, your baby and your belly have grown quite large. It is almost time to give birth. A full-term pregnancy can deliver between 37 and 42 weeks. Your baby's lungs are almost ready to breathe air. The bones in your baby's head are now firm enough to protect it, but soft enough to move down through the birth canal.  You may feel excited, happy, anxious, or scared. You may wonder how you will know if you are in labor or what to expect during labor. Try to be flexible in your expectations of the birth. Because each birth is different, there is no way to know exactly what childbirth will be like for you. This care sheet will help you know what to expect and how to prepare. This may make your childbirth easier. If you haven't already had the Tdap shot during this pregnancy, talk to your doctor about getting it. It will help protect your  against pertussis infection. In the 36th week, most women have a test for group B streptococcus (GBS). GBS is a common bacteria that can live in the vagina and rectum. It can make your baby sick after birth. If you test positive, you will get antibiotics during labor. The medicine will keep your baby from getting the bacteria. Follow-up care is a key part of your treatment and safety. Be sure to make and go to all appointments, and call your doctor if you are having problems. It's also a good idea to know your test results and keep a list of the medicines you take. How can you care for yourself at home? Learn about pain relief choices  · Pain is different for every woman. Talk with your doctor about your feelings about pain. · You can choose from several types of pain relief. These include medicine or breathing techniques, as well as comfort measures. You can use more than one option. · If you choose to have pain medicine during labor, talk to your doctor about your options.  Some medicines lower anxiety and help with some of the pain. Others make your lower body numb so that you won't feel pain. · Be sure to tell your doctor about your pain medicine choice before you start labor or very early in your labor. You may be able to change your mind as labor progresses. · Rarely, a woman is put to sleep by medicine given through a mask or an IV. Labor and delivery  · The first stage of labor has three parts: early, active, and transition. ? Most women have early labor at home. You can stay busy or rest, eat light snacks, drink clear fluids, and start counting contractions. ? When talking during a contraction gets hard, you may be moving to active labor. During active labor, you should head for the hospital if you are not there already. ? You are in active labor when contractions come every 3 to 4 minutes and last about 60 seconds. Your cervix is opening more rapidly. ? If your water breaks, contractions will come faster and stronger. ? During transition, your cervix is stretching, and contractions are coming more rapidly. ? You may want to push, but your cervix might not be ready. Your doctor will tell you when to push. · The second stage starts when your cervix is completely opened and you are ready to push. ? Contractions are very strong to push the baby down the birth canal.  ? You will feel the urge to push. You may feel like you need to have a bowel movement. ? You may be coached to push with contractions. These contractions will be very strong, but you will not have them as often. You can get a little rest between contractions. ? You may be emotional and irritable. You may not be aware of what is going on around you.  ? One last push, and your baby is born. · The third stage is when a few more contractions push out the placenta. This may take 30 minutes or less. · The fourth stage is the welcome recovery. You may feel overwhelmed with emotions and exhausted but alert.  This is a good time to start breastfeeding. Where can you learn more? Go to http://alberto-brady.info/. Enter J619 in the search box to learn more about \"Weeks 34 to 36 of Your Pregnancy: Care Instructions. \"  Current as of: November 21, 2017  Content Version: 11.8  © 7840-2259 [employer]. Care instructions adapted under license by [employer] (which disclaims liability or warranty for this information). If you have questions about a medical condition or this instruction, always ask your healthcare professional. [address] any warranty or liability for your use of this information.

## 2018-12-18 ENCOUNTER — ROUTINE PRENATAL (OUTPATIENT)
Dept: OBGYN CLINIC | Age: 23
End: 2018-12-18

## 2018-12-18 VITALS — WEIGHT: 174.8 LBS | DIASTOLIC BLOOD PRESSURE: 58 MMHG | BODY MASS INDEX: 29.09 KG/M2 | SYSTOLIC BLOOD PRESSURE: 108 MMHG

## 2018-12-18 DIAGNOSIS — Z34.83 ENCOUNTER FOR SUPERVISION OF OTHER NORMAL PREGNANCY, THIRD TRIMESTER: Primary | ICD-10-CM

## 2018-12-18 NOTE — PROGRESS NOTES
Problem List  Never Reviewed          Codes Class Noted    Encounter for supervision of other normal pregnancy, second trimester ICD-10-CM: Z34.82  ICD-9-CM: V22.1  7/19/2018    Overview Addendum 10/23/2018 11:18 AM by Kusum Gonzalez MD     Vulvar varicosities  Lived in [facility] first 4 months of pregnancy--US at 15w5d and 20 both normal.  AFP Tetra-Negative  Flu shot received 10/9/18

## 2018-12-24 ENCOUNTER — ROUTINE PRENATAL (OUTPATIENT)
Dept: OBGYN CLINIC | Age: 23
End: 2018-12-24

## 2018-12-24 VITALS
HEIGHT: 65 IN | SYSTOLIC BLOOD PRESSURE: 100 MMHG | WEIGHT: 174 LBS | DIASTOLIC BLOOD PRESSURE: 62 MMHG | BODY MASS INDEX: 28.99 KG/M2

## 2018-12-24 DIAGNOSIS — Z34.83 ENCOUNTER FOR SUPERVISION OF OTHER NORMAL PREGNANCY, THIRD TRIMESTER: Primary | ICD-10-CM

## 2018-12-24 NOTE — PATIENT INSTRUCTIONS

## 2018-12-26 ENCOUNTER — ANESTHESIA (OUTPATIENT)
Dept: LABOR AND DELIVERY | Age: 23
End: 2018-12-26
Payer: COMMERCIAL

## 2018-12-26 ENCOUNTER — ANESTHESIA EVENT (OUTPATIENT)
Dept: LABOR AND DELIVERY | Age: 23
End: 2018-12-26
Payer: COMMERCIAL

## 2018-12-26 ENCOUNTER — TELEPHONE (OUTPATIENT)
Dept: OBGYN CLINIC | Age: 23
End: 2018-12-26

## 2018-12-26 ENCOUNTER — HOSPITAL ENCOUNTER (INPATIENT)
Age: 23
LOS: 2 days | Discharge: HOME | End: 2018-12-28
Attending: OBSTETRICS & GYNECOLOGY | Admitting: OBSTETRICS & GYNECOLOGY
Payer: COMMERCIAL

## 2018-12-26 DIAGNOSIS — G97.1 SPINAL HEADACHE: Primary | ICD-10-CM

## 2018-12-26 LAB
BASOPHILS # BLD: 0 K/UL (ref 0–0.1)
BASOPHILS NFR BLD: 0 % (ref 0–1)
DIFFERENTIAL METHOD BLD: ABNORMAL
EOSINOPHIL # BLD: 0.2 K/UL (ref 0–0.4)
EOSINOPHIL NFR BLD: 3 % (ref 0–7)
ERYTHROCYTE [DISTWIDTH] IN BLOOD BY AUTOMATED COUNT: 12.3 % (ref 11.5–14.5)
HCT VFR BLD AUTO: 33.5 % (ref 35–47)
HGB BLD-MCNC: 11 G/DL (ref 11.5–16)
IMM GRANULOCYTES # BLD: 0 K/UL (ref 0–0.04)
IMM GRANULOCYTES NFR BLD AUTO: 1 % (ref 0–0.5)
LYMPHOCYTES # BLD: 1.3 K/UL (ref 0.8–3.5)
LYMPHOCYTES NFR BLD: 20 % (ref 12–49)
MCH RBC QN AUTO: 31.2 PG (ref 26–34)
MCHC RBC AUTO-ENTMCNC: 32.8 G/DL (ref 30–36.5)
MCV RBC AUTO: 94.9 FL (ref 80–99)
MONOCYTES # BLD: 0.6 K/UL (ref 0–1)
MONOCYTES NFR BLD: 8 % (ref 5–13)
NEUTS SEG # BLD: 4.6 K/UL (ref 1.8–8)
NEUTS SEG NFR BLD: 68 % (ref 32–75)
NRBC # BLD: 0 K/UL (ref 0–0.01)
NRBC BLD-RTO: 0 PER 100 WBC
PLATELET # BLD AUTO: 236 K/UL (ref 150–400)
PMV BLD AUTO: 10.1 FL (ref 8.9–12.9)
RBC # BLD AUTO: 3.53 M/UL (ref 3.8–5.2)
WBC # BLD AUTO: 6.7 K/UL (ref 3.6–11)

## 2018-12-26 PROCEDURE — 77010026065 HC OXYGEN MINIMUM MEDICAL AIR: Performed by: OBSTETRICS & GYNECOLOGY

## 2018-12-26 PROCEDURE — 0KQM0ZZ REPAIR PERINEUM MUSCLE, OPEN APPROACH: ICD-10-PCS | Performed by: OBSTETRICS & GYNECOLOGY

## 2018-12-26 PROCEDURE — 74011000250 HC RX REV CODE- 250

## 2018-12-26 PROCEDURE — 4A1H74Z MONITORING OF PRODUCTS OF CONCEPTION, CARDIAC ELECTRICAL ACTIVITY, VIA NATURAL OR ARTIFICIAL OPENING: ICD-10-PCS | Performed by: OBSTETRICS & GYNECOLOGY

## 2018-12-26 PROCEDURE — 76060000078 HC EPIDURAL ANESTHESIA: Performed by: ANESTHESIOLOGY

## 2018-12-26 PROCEDURE — 75410000003 HC RECOV DEL/VAG/CSECN EA 0.5 HR: Performed by: OBSTETRICS & GYNECOLOGY

## 2018-12-26 PROCEDURE — 99218 HC RM OBSERVATION: CPT

## 2018-12-26 PROCEDURE — 85025 COMPLETE CBC W/AUTO DIFF WBC: CPT

## 2018-12-26 PROCEDURE — 74011000250 HC RX REV CODE- 250: Performed by: ANESTHESIOLOGY

## 2018-12-26 PROCEDURE — 36415 COLL VENOUS BLD VENIPUNCTURE: CPT

## 2018-12-26 PROCEDURE — 77030014125 HC TY EPDRL BBMI -B: Performed by: ANESTHESIOLOGY

## 2018-12-26 PROCEDURE — 99284 EMERGENCY DEPT VISIT MOD MDM: CPT

## 2018-12-26 PROCEDURE — 74011250636 HC RX REV CODE- 250/636: Performed by: ANESTHESIOLOGY

## 2018-12-26 PROCEDURE — 75410000000 HC DELIVERY VAGINAL/SINGLE: Performed by: OBSTETRICS & GYNECOLOGY

## 2018-12-26 PROCEDURE — 59025 FETAL NON-STRESS TEST: CPT

## 2018-12-26 PROCEDURE — 77030011943

## 2018-12-26 PROCEDURE — 74011250636 HC RX REV CODE- 250/636: Performed by: OBSTETRICS & GYNECOLOGY

## 2018-12-26 PROCEDURE — 75410000002 HC LABOR FEE PER 1 HR: Performed by: OBSTETRICS & GYNECOLOGY

## 2018-12-26 PROCEDURE — 74011250636 HC RX REV CODE- 250/636

## 2018-12-26 PROCEDURE — 65270000029 HC RM PRIVATE

## 2018-12-26 RX ORDER — RANITIDINE 150 MG/1
150 TABLET, FILM COATED ORAL 2 TIMES DAILY
COMMUNITY
End: 2020-06-17

## 2018-12-26 RX ORDER — SODIUM CHLORIDE 0.9 % (FLUSH) 0.9 %
5-10 SYRINGE (ML) INJECTION EVERY 8 HOURS
Status: DISCONTINUED | OUTPATIENT
Start: 2018-12-26 | End: 2018-12-27

## 2018-12-26 RX ORDER — OXYTOCIN/0.9 % SODIUM CHLORIDE 30/500 ML
PLASTIC BAG, INJECTION (ML) INTRAVENOUS
Status: COMPLETED
Start: 2018-12-26 | End: 2018-12-26

## 2018-12-26 RX ORDER — SODIUM CHLORIDE, SODIUM LACTATE, POTASSIUM CHLORIDE, CALCIUM CHLORIDE 600; 310; 30; 20 MG/100ML; MG/100ML; MG/100ML; MG/100ML
125 INJECTION, SOLUTION INTRAVENOUS CONTINUOUS
Status: DISCONTINUED | OUTPATIENT
Start: 2018-12-26 | End: 2018-12-27

## 2018-12-26 RX ORDER — SODIUM CHLORIDE 0.9 % (FLUSH) 0.9 %
5-10 SYRINGE (ML) INJECTION AS NEEDED
Status: DISCONTINUED | OUTPATIENT
Start: 2018-12-26 | End: 2018-12-27

## 2018-12-26 RX ORDER — NALOXONE HYDROCHLORIDE 0.4 MG/ML
0.4 INJECTION, SOLUTION INTRAMUSCULAR; INTRAVENOUS; SUBCUTANEOUS AS NEEDED
Status: DISCONTINUED | OUTPATIENT
Start: 2018-12-26 | End: 2018-12-27

## 2018-12-26 RX ORDER — ONDANSETRON 2 MG/ML
4 INJECTION INTRAMUSCULAR; INTRAVENOUS
Status: DISCONTINUED | OUTPATIENT
Start: 2018-12-26 | End: 2018-12-27

## 2018-12-26 RX ORDER — EPHEDRINE SULFATE 50 MG/ML
10 INJECTION, SOLUTION INTRAVENOUS
Status: COMPLETED | OUTPATIENT
Start: 2018-12-26 | End: 2018-12-26

## 2018-12-26 RX ORDER — OXYTOCIN/0.9 % SODIUM CHLORIDE 30/500 ML
0-20 PLASTIC BAG, INJECTION (ML) INTRAVENOUS
Status: DISCONTINUED | OUTPATIENT
Start: 2018-12-26 | End: 2018-12-27

## 2018-12-26 RX ORDER — NALBUPHINE HYDROCHLORIDE 10 MG/ML
2.5 INJECTION, SOLUTION INTRAMUSCULAR; INTRAVENOUS; SUBCUTANEOUS
Status: ACTIVE | OUTPATIENT
Start: 2018-12-26 | End: 2018-12-26

## 2018-12-26 RX ORDER — BUPIVACAINE HYDROCHLORIDE 2.5 MG/ML
INJECTION, SOLUTION EPIDURAL; INFILTRATION; INTRACAUDAL AS NEEDED
Status: DISCONTINUED | OUTPATIENT
Start: 2018-12-26 | End: 2018-12-26 | Stop reason: HOSPADM

## 2018-12-26 RX ORDER — FENTANYL/BUPIVACAINE/NS/PF 2-1250MCG
1-16 PREFILLED PUMP RESERVOIR EPIDURAL CONTINUOUS
Status: DISCONTINUED | OUTPATIENT
Start: 2018-12-26 | End: 2018-12-27

## 2018-12-26 RX ADMIN — ONDANSETRON 4 MG: 2 INJECTION INTRAMUSCULAR; INTRAVENOUS at 19:24

## 2018-12-26 RX ADMIN — EPHEDRINE SULFATE 10 MG: 50 INJECTION, SOLUTION INTRAVENOUS at 22:06

## 2018-12-26 RX ADMIN — SODIUM CHLORIDE, SODIUM LACTATE, POTASSIUM CHLORIDE, AND CALCIUM CHLORIDE 999 ML/HR: 600; 310; 30; 20 INJECTION, SOLUTION INTRAVENOUS at 18:01

## 2018-12-26 RX ADMIN — BUPIVACAINE HYDROCHLORIDE 10 ML: 2.5 INJECTION, SOLUTION EPIDURAL; INFILTRATION; INTRACAUDAL at 19:38

## 2018-12-26 RX ADMIN — Medication 2 MILLI-UNITS/MIN: at 20:08

## 2018-12-26 RX ADMIN — SODIUM CHLORIDE, SODIUM LACTATE, POTASSIUM CHLORIDE, AND CALCIUM CHLORIDE 125 ML/HR: 600; 310; 30; 20 INJECTION, SOLUTION INTRAVENOUS at 21:34

## 2018-12-26 RX ADMIN — Medication 12 ML/HR: at 19:50

## 2018-12-26 RX ADMIN — SODIUM CHLORIDE, SODIUM LACTATE, POTASSIUM CHLORIDE, AND CALCIUM CHLORIDE 125 ML/HR: 600; 310; 30; 20 INJECTION, SOLUTION INTRAVENOUS at 19:01

## 2018-12-26 RX ADMIN — OXYTOCIN-SODIUM CHLORIDE 0.9% IV SOLN 30 UNIT/500ML 2 MILLI-UNITS/MIN: 30-0.9/5 SOLUTION at 20:08

## 2018-12-26 NOTE — H&P
History & Physical    Name: Richie Killian MRN: [id number]  SSN: [id number]    YOB: 1995  Age: 26 y.o. Sex: female        Subjective:     Estimated Date of Delivery: 19  OB History      Para Term  AB Living    4 1 1   2 1    SAB TAB Ectopic Molar Multiple Live Births    2         1          Ms. Cassandra Son is admitted with pregnancy at 38w4d for active labor. Prenatal course was normal. Please see prenatal records for details. Group B Strep was negative. Past Medical History:   Diagnosis Date    Asthma     Phlebitis and thrombophlebitis     vulvar varicosity    Psychiatric disorder     anxiety     Past Surgical History:   Procedure Laterality Date    HX OTHER SURGICAL      keloid x3 removal on ear     Social History     Occupational History    Not on file   Tobacco Use    Smoking status: Never Smoker    Smokeless tobacco: Never Used   Substance and Sexual Activity    Alcohol use: No    Drug use: No    Sexual activity: Yes     Partners: Male     Birth control/protection: None     History reviewed. No pertinent family history. Allergies   Allergen Reactions    Pecan Nut Swelling     Itching, numbness inside the mouth, swelling of the face    [place] Swelling     Prior to Admission medications    Medication Sig Start Date End Date Taking? Authorizing Provider   raNITIdine (ZANTAC) 150 mg tablet Take 150 mg by mouth two (2) times a day. Yes Provider, Historical   ondansetron (ZOFRAN ODT) 8 mg disintegrating tablet Take 1 Tab by mouth every eight (8) hours as needed for Nausea. 18  Yes Verónica Walker MD   loratadine 10 mg Cap Take  by mouth daily. 10 mg daily    Yes Other, MD Sultana   nitrofurantoin, macrocrystal-monohydrate, (MACROBID) 100 mg capsule Take 1 Cap by mouth two (2) times a day. 18   Verónica Walker MD   FLUoxetine (PROZAC) 20 mg capsule Take 20 mg by mouth daily.     Provider, Historical        Review of Systems: A comprehensive review of systems was negative except for that written in the HPI. Objective:     Vitals:  Vitals:    12/26/18 1519 12/26/18 1521 12/26/18 1523 12/26/18 1528   BP: 105/71      Pulse: (!) 109      Resp: 14      Temp: 98.6 °F (37 °C)      SpO2:   100% 100%   Weight:  174 lb (78.9 kg)     Height:  5' 5\" (1.651 m)          Physical Exam:  Abdomen: soft, nontender  Fundus: soft and non tender  Perineum: blood absent, amniotic fluid absent  Cervical Exam: 5/ 70/ -2/ bulging BOW  Lower Extremities:  - Edema No  Membranes:  Intact  Fetal Heart Rate: Reactive    Prenatal Labs:   Lab Results   Component Value Date/Time    Rubella, External 3.01 07/19/2018    GrBStrep, External Negative 12/04/2018    HBsAg, External Negative 07/19/2018    HIV, External Negative 07/19/2018    Gonorrhea, External Negative 07/19/2018    Chlamydia, External Negative 07/19/2018        Assessment/Plan:     Plan: Reassuring fetal status, Continue plan for vaginal delivery.       Signed By:  Modesto Sanchez MD     December 26, 2018

## 2018-12-26 NOTE — TELEPHONE ENCOUNTER
Patient calling , 38w4d pregnant, C/O consistent contractions at 6 minutes a part. Patient denies vaginal bleeding/ROM and reports fetal movement. Patient was 4 cm dilated at her visit on 2018 and was instructed to call the office when her contractions reach 6 min a part. Spoke with Dr. Nancy Diamond - advised to send to L&D. Patient aware and on the way. Spoke with [facility] L&D - incoming report called in.

## 2018-12-26 NOTE — ROUTINE PROCESS
1515: Patient admitted to labor and delivery triage following reports of tana every 6-10 minutes starting around noon today. Pt changed into gown and oriented to room and unit. Pt denying leaking of vaginal fluid or vaginal bleeding, pt confirming feeling fetal movement at this time. Plan of care discussed with pt, pt verbalizing understanding. Pt placed on FHR and toco monitor, vital signs taken and assessment completed. 1325: Pt stating she has felt \"1 contraction since 3:03PM\"    1615: Dr. Brock Siu at pt bedside for pt assessment at this time. SVE per MD, 5cm dilation and bulging bag noted. MD VORLINDA admit pt and allow pt to ambulate in hallway with intermittent monitoring. MD discussing plan of care with pt, pt verbalizing understanding. 1705: Pt taken off of FHR and toco monitoring for intermittent monitoring and ambulation per Dr. Brock Siu order and pt request. Pt to ambulate with sister    [phone]: Dr. Brock Siu at pt bedside for SVE at this time, MD noting 6cm dilation. MD performing AROM at this time, moderate amount of clear fluid noted, miranda care performed and pad changed. Pt bolusing for epidural at this time    1850: Bedside, Verbal and Written shift change report given to Garrett Varghese RN (oncoming nurse) by Clarence Ibanez RN (offgoing nurse). Report included the following information SBAR, Kardex, Intake/Output, MAR, Accordion and Recent Results.

## 2018-12-27 PROCEDURE — 74011000250 HC RX REV CODE- 250: Performed by: ANESTHESIOLOGY

## 2018-12-27 PROCEDURE — 74011250636 HC RX REV CODE- 250/636: Performed by: ANESTHESIOLOGY

## 2018-12-27 PROCEDURE — 77030018846 HC SOL IRR STRL H20 ICUM -A

## 2018-12-27 PROCEDURE — 74011250637 HC RX REV CODE- 250/637: Performed by: ANESTHESIOLOGY

## 2018-12-27 PROCEDURE — 65270000029 HC RM PRIVATE

## 2018-12-27 PROCEDURE — 77030021125

## 2018-12-27 PROCEDURE — 74011250637 HC RX REV CODE- 250/637: Performed by: OBSTETRICS & GYNECOLOGY

## 2018-12-27 PROCEDURE — 74011250636 HC RX REV CODE- 250/636: Performed by: OBSTETRICS & GYNECOLOGY

## 2018-12-27 RX ORDER — FENTANYL CITRATE 50 UG/ML
25 INJECTION, SOLUTION INTRAMUSCULAR; INTRAVENOUS
Status: DISCONTINUED | OUTPATIENT
Start: 2018-12-27 | End: 2018-12-28 | Stop reason: HOSPADM

## 2018-12-27 RX ORDER — ACETAMINOPHEN 325 MG/1
650 TABLET ORAL
Status: DISCONTINUED | OUTPATIENT
Start: 2018-12-27 | End: 2018-12-28 | Stop reason: HOSPADM

## 2018-12-27 RX ORDER — ONDANSETRON 4 MG/1
8 TABLET, ORALLY DISINTEGRATING ORAL
Status: DISCONTINUED | OUTPATIENT
Start: 2018-12-27 | End: 2018-12-28 | Stop reason: HOSPADM

## 2018-12-27 RX ORDER — NALOXONE HYDROCHLORIDE 0.4 MG/ML
0.4 INJECTION, SOLUTION INTRAMUSCULAR; INTRAVENOUS; SUBCUTANEOUS AS NEEDED
Status: DISCONTINUED | OUTPATIENT
Start: 2018-12-27 | End: 2018-12-28 | Stop reason: HOSPADM

## 2018-12-27 RX ORDER — BUTALBITAL, ACETAMINOPHEN AND CAFFEINE 50; 325; 40 MG/1; MG/1; MG/1
1 TABLET ORAL
Status: DISCONTINUED | OUTPATIENT
Start: 2018-12-27 | End: 2018-12-28 | Stop reason: HOSPADM

## 2018-12-27 RX ORDER — FENTANYL CITRATE 50 UG/ML
25 INJECTION, SOLUTION INTRAMUSCULAR; INTRAVENOUS
Status: DISCONTINUED | OUTPATIENT
Start: 2018-12-27 | End: 2018-12-27 | Stop reason: SDUPTHER

## 2018-12-27 RX ORDER — OXYTOCIN/RINGER'S LACTATE 20/1000 ML
125-500 PLASTIC BAG, INJECTION (ML) INTRAVENOUS ONCE
Status: ACTIVE | OUTPATIENT
Start: 2018-12-27 | End: 2018-12-27

## 2018-12-27 RX ORDER — DIPHENHYDRAMINE HCL 25 MG
25 CAPSULE ORAL
Status: DISCONTINUED | OUTPATIENT
Start: 2018-12-27 | End: 2018-12-28 | Stop reason: HOSPADM

## 2018-12-27 RX ORDER — HYDROCODONE BITARTRATE AND ACETAMINOPHEN 10; 325 MG/1; MG/1
1 TABLET ORAL
Status: DISCONTINUED | OUTPATIENT
Start: 2018-12-27 | End: 2018-12-28 | Stop reason: HOSPADM

## 2018-12-27 RX ORDER — BISACODYL 5 MG
5 TABLET, DELAYED RELEASE (ENTERIC COATED) ORAL DAILY PRN
Status: DISCONTINUED | OUTPATIENT
Start: 2018-12-27 | End: 2018-12-28 | Stop reason: HOSPADM

## 2018-12-27 RX ORDER — ZOLPIDEM TARTRATE 5 MG/1
5 TABLET ORAL
Status: DISCONTINUED | OUTPATIENT
Start: 2018-12-27 | End: 2018-12-28 | Stop reason: HOSPADM

## 2018-12-27 RX ORDER — IBUPROFEN 800 MG/1
800 TABLET ORAL EVERY 8 HOURS
Status: DISCONTINUED | OUTPATIENT
Start: 2018-12-27 | End: 2018-12-28 | Stop reason: HOSPADM

## 2018-12-27 RX ORDER — HYDROMORPHONE HYDROCHLORIDE 2 MG/ML
1 INJECTION, SOLUTION INTRAMUSCULAR; INTRAVENOUS; SUBCUTANEOUS
Status: DISCONTINUED | OUTPATIENT
Start: 2018-12-27 | End: 2018-12-28 | Stop reason: HOSPADM

## 2018-12-27 RX ORDER — HYDROCORTISONE ACETATE PRAMOXINE HCL 2.5; 1 G/100G; G/100G
CREAM TOPICAL AS NEEDED
Status: DISCONTINUED | OUTPATIENT
Start: 2018-12-27 | End: 2018-12-28 | Stop reason: HOSPADM

## 2018-12-27 RX ORDER — SODIUM CHLORIDE, SODIUM LACTATE, POTASSIUM CHLORIDE, CALCIUM CHLORIDE 600; 310; 30; 20 MG/100ML; MG/100ML; MG/100ML; MG/100ML
125 INJECTION, SOLUTION INTRAVENOUS CONTINUOUS
Status: DISCONTINUED | OUTPATIENT
Start: 2018-12-27 | End: 2018-12-28

## 2018-12-27 RX ORDER — METHYLERGONOVINE MALEATE 0.2 MG/ML
0.2 INJECTION INTRAVENOUS ONCE
Status: ACTIVE | OUTPATIENT
Start: 2018-12-27 | End: 2018-12-27

## 2018-12-27 RX ORDER — SIMETHICONE 80 MG
80 TABLET,CHEWABLE ORAL
Status: DISCONTINUED | OUTPATIENT
Start: 2018-12-27 | End: 2018-12-28 | Stop reason: HOSPADM

## 2018-12-27 RX ORDER — ONDANSETRON 2 MG/ML
8 INJECTION INTRAMUSCULAR; INTRAVENOUS
Status: DISCONTINUED | OUTPATIENT
Start: 2018-12-27 | End: 2018-12-27

## 2018-12-27 RX ORDER — ONDANSETRON 4 MG/1
8 TABLET, ORALLY DISINTEGRATING ORAL
Status: COMPLETED | OUTPATIENT
Start: 2018-12-27 | End: 2018-12-27

## 2018-12-27 RX ADMIN — CAFFEINE AND SODIUM BENZOATE 500 MG: 125 INJECTION, SOLUTION INTRAMUSCULAR; INTRAVENOUS at 15:57

## 2018-12-27 RX ADMIN — IBUPROFEN 800 MG: 800 TABLET ORAL at 00:25

## 2018-12-27 RX ADMIN — HYDROCODONE BITARTRATE AND ACETAMINOPHEN 1 TABLET: 10; 325 TABLET ORAL at 03:24

## 2018-12-27 RX ADMIN — HYDROMORPHONE HYDROCHLORIDE 1 MG: 2 INJECTION, SOLUTION INTRAMUSCULAR; INTRAVENOUS; SUBCUTANEOUS at 11:42

## 2018-12-27 RX ADMIN — HYDROCODONE BITARTRATE AND ACETAMINOPHEN 1 TABLET: 10; 325 TABLET ORAL at 07:31

## 2018-12-27 RX ADMIN — SODIUM CHLORIDE, SODIUM LACTATE, POTASSIUM CHLORIDE, AND CALCIUM CHLORIDE 125 ML/HR: 600; 310; 30; 20 INJECTION, SOLUTION INTRAVENOUS at 22:08

## 2018-12-27 RX ADMIN — ACETAMINOPHEN 650 MG: 325 TABLET, FILM COATED ORAL at 01:43

## 2018-12-27 RX ADMIN — BUTALBITAL, ACETAMINOPHEN AND CAFFEINE 1 TABLET: 50; 325; 40 TABLET ORAL at 12:51

## 2018-12-27 RX ADMIN — ONDANSETRON 8 MG: 4 TABLET, ORALLY DISINTEGRATING ORAL at 22:29

## 2018-12-27 RX ADMIN — IBUPROFEN 800 MG: 800 TABLET ORAL at 22:29

## 2018-12-27 RX ADMIN — IBUPROFEN 800 MG: 800 TABLET ORAL at 07:31

## 2018-12-27 RX ADMIN — ONDANSETRON 8 MG: 4 TABLET, ORALLY DISINTEGRATING ORAL at 09:04

## 2018-12-27 RX ADMIN — ONDANSETRON 8 MG: 4 TABLET, ORALLY DISINTEGRATING ORAL at 17:07

## 2018-12-27 RX ADMIN — IBUPROFEN 800 MG: 800 TABLET ORAL at 15:36

## 2018-12-27 RX ADMIN — SODIUM CHLORIDE, SODIUM LACTATE, POTASSIUM CHLORIDE, AND CALCIUM CHLORIDE 125 ML/HR: 600; 310; 30; 20 INJECTION, SOLUTION INTRAVENOUS at 12:51

## 2018-12-27 RX ADMIN — BUTALBITAL, ACETAMINOPHEN AND CAFFEINE 1 TABLET: 50; 325; 40 TABLET ORAL at 17:12

## 2018-12-27 RX ADMIN — BUTALBITAL, ACETAMINOPHEN AND CAFFEINE 1 TABLET: 50; 325; 40 TABLET ORAL at 20:26

## 2018-12-27 NOTE — PROGRESS NOTES
0373 President  Dr. Dee Uriostegui, left message requesting anesthesia consult. .  Patient complaining of headache, improves with lying flat. 7/10 before medication, 5/10 after. Gave patient cup of coffee. 1044-Spoke with Dr. Dee Uriostegui. OK to request anesthesia eval for spinal headache. Called Dr. Addi Sow to request.     1130-started IV fluid per Dr. Marcio Alegria orders. Administered IV Dilaudid which was already on patient MAR. Upon pain reassessment patient reported 0/10. Pharmacy trying to locate IV caffeine not regularly stocked on units. 1410-call to Dr. Dee Uriostegui advised patient nauseous, orders discontinued on MAR. Received order for zofran 8mg Q6 as needed.

## 2018-12-27 NOTE — ROUTINE PROCESS
Bedside and Verbal shift change report given to Aden Hoover RN (oncoming nurse) by Samira Emanuel RN (offgoing nurse). Report given with SBAR, Kardex, Intake/Output and MAR.

## 2018-12-27 NOTE — ANESTHESIA PREPROCEDURE EVALUATION
Anesthetic History   No history of anesthetic complications            Review of Systems / Medical History  Patient summary reviewed and pertinent labs reviewed    Pulmonary            Asthma        Neuro/Psych         Psychiatric history     Cardiovascular  Within defined limits                Exercise tolerance: >4 METS     GI/Hepatic/Renal  Within defined limits              Endo/Other  Within defined limits           Other Findings              Physical Exam    Airway  Mallampati: II  TM Distance: 4 - 6 cm  Neck ROM: normal range of motion   Mouth opening: Normal     Cardiovascular  Regular rate and rhythm,  S1 and S2 normal,  no murmur, click, rub, or gallop  Rhythm: regular  Rate: normal         Dental  No notable dental hx       Pulmonary  Breath sounds clear to auscultation               Abdominal  GI exam deferred       Other Findings            Anesthetic Plan    ASA: 2  Anesthesia type: epidural            Anesthetic plan and risks discussed with: Patient      Charting completed after epidural placement.

## 2018-12-27 NOTE — CONSULTS
Reviewed in mother and infant unit. Severe postural headache, eased by lying flat. Had epidural for  last night. No documented wet tap. Discussed blood patch for post dural puncture headache, however patient not keen to have this. Suggetsed start iv fluids with caffiene + fioreset orally and iv fentanyl 25 ug boluses till get control of pain. Thank you.

## 2018-12-27 NOTE — ROUTINE PROCESS
0400: Pt states that headache did not get better with medication. Does feel better when laid supine, flat on back. She states that she would like to sleep for a few hours to see if a change is noticed, and if not, would like to discuss with MD. Will recheck her pain at 0540. 0540: Headache better after sleep. Will continue to monitor. 6199: Pt states that headache is back. Vitals are within normal limits. Day shift RN notified during report.

## 2018-12-27 NOTE — ANESTHESIA PROCEDURE NOTES
Epidural Block    Start time: 12/26/2018 7:31 PM  End time: 12/26/2018 7:42 PM  Performed by: Juan F Rice MD  Authorized by: Juan F Rice MD     Pre-Procedure  Indication: labor epidural    Preanesthetic Checklist: patient identified, risks and benefits discussed, anesthesia consent, timeout performed and anesthesia consent    Timeout Time: 19:31        Epidural:   Patient position:  Seated  Prep region:  Lumbar  Prep: Betadine    Location:  L3-4    Needle and Epidural Catheter:   Needle Type:  Tuohy  Needle Gauge:  17 G  Injection Technique:  Loss of resistance using air  Attempts:  1  Catheter Size:  18 G  Events: no paresthesia and negative aspiration test    Test Dose:  Lidocaine 1.5% w/ epi and negative    Assessment:   Catheter Secured:  Tegaderm and tape  Insertion:  Uncomplicated  Patient tolerance:  Patient tolerated the procedure well with no immediate complications

## 2018-12-27 NOTE — LACTATION NOTE
This note was copied from a baby's chart. Mother resting in room with sister and baby. Mother states she would like to pump and feed EBM. Mother feeding baby formula at this time. BF basics reviewed with pt and family, questions answered. Mother states she has attemepted to latch baby without success, reassured mother BF is a learned behavior. Encouraged mother to call out if she would like help with putting baby to breast.  Set mother up to pump. Discussed with mother her plan for feeding. Reviewed the benefits of exclusive breast milk feeding during the hospital stay. Informed her of the risks of using formula to supplement in the first few days of life as well as the benefits of successful breast milk feeding; referred her to the Breastfeeding booklet about this information. She acknowledges understanding of information reviewed and states that it is her plan to feed formula and pumped EBM to her infant. Will support her choice and offer additional information as needed. Reviewed breastfeeding basics:  How milk is made and normal  breastfeeding behaviors discussed. Supply and demand,  stomach size, early feeding cues, skin to skin bonding with comfortable positioning and baby led latch-on reviewed. How to identify signs of successful breastfeeding sessions reviewed; education on assymetrical latch, signs of effective latching vs shallow, in-effective latching, normal  feeding frequency and duration and expected infant output discussed. Normal course of breastfeeding discussed including the AAP's recommendation that children receive exclusive breast milk feedings for the first six months of life with breast milk feedings to continue through the first year of life and/or beyond as complimentary table foods are added. Breastfeeding Booklet and Warm line information provided with discussion.   Discussed typical  weight loss and the importance of pediatrician appointment within 24-48 hours of discharge, at 2 weeks of life and normalcy of requesting pediatric weight checks as needed in between visits. Hand Expression Education:  Mom taught how to manually hand express her colostrum. Emphasized the importance of providing infant with valuable colostrum as infant rests skin to skin at breast.  Aware to avoid extended periods of non-feeding. Aware to offer 10-20+ drops of colostrum every 2-3 hours until infant is latching and nursing effectively. Taught the rationale behind this low tech but highly effective evidence based practice. Pumping:  Guidelines for pumping, milk collection and storage, proper cleaning of pump parts all reviewed. How to establish and maintain breast milk supply through pumping reviewed. Differences between hospital grade rental pumps vs store bought double electric/hand pumps discussed. Set up pumping with double electric set up. Assisted with pump session. List of area pump rental locations and lactation support services provided. Pt will successfully establish breastfeeding by feeding in response to early feeding cues   or wake every 3h, will obtain deep latch, and will keep log of feedings/output. Taught to BF at hunger cues and or q 2-3 hrs and to offer 10-20 drops of hand expressed colostrum at any non-feeds.       Breast Assessment  Left Breast: Small   Left Nipple: Everted, Intact  Right Breast: Small   Right Nipple: Everted, Intact  Breast- Feeding Assessment  Attends Breast-Feeding Classes: No  Breast-Feeding Experience: No  Breast Trauma/Surgery: No  Type/Quality: (formula feeding at this time)  Lactation Consultant Visits  Breast-Feedings: Not breast-feeding(mother states she would like to pump and feed EBM)  Mother/Infant Observation  Mother Observation: Breast comfortable

## 2018-12-27 NOTE — PROGRESS NOTES
Pt decided she doesn't want an epidural at this time, discussed if things move quickly in her labor she may not have time to get one, pt says she wants alittle more time to think about it, iv fluid rate slowed, discussed having to redo bolus if she decides she doesn't want it soon    - Pt does want an epidural now, will proceed    - Dr. Zoe Real notified of pt's request for epidural, will be over in 5-10 min    - Dr. Zoe Real at bedside to place epidural    - Will start some pitocin to get contractions closer together per Dr. Maxwell Herrera    - Pt shaking, states she feels, B/p cuff readjusted, IV bolus started    - Pt c/o of SOB, head of bed elevated, pt states it feels better    - Cuff adjusted, pt shaking & not having any other symptoms, will bolus and monitor    -Pt c/o numbness up to nipple line, dermatome level confirmed, pump paused, pt sat up,O2 remains on, no other complaints at this time  - Dr. Zoe Real notified of pt's level & actions, will leave pump off until level is at umblicus, than restart at 10ml for a continuous rate    245- Pt barely able to lift buttocks, placed bedpan, with no results pt straight cathed, pericare done, underwear and pads put on    308-SBAR OUT Report: Mother    Verbal report given to GEORGIA Taylor Rn(full name & credentials) on this patient, who is now being transferred to MIU (unit) for routine progression of care. Report consisted of patient's Situation, Background, Assessment and Recommendations (SBAR). Alloy ID bands were compared with the identification form, and verified with the patient and receiving nurse. Information from the SBAR and the [address] [place] Report was reviewed with the receiving nurse; opportunity for questions and clarification provided.

## 2018-12-27 NOTE — PROGRESS NOTES
Post-Partum Day Number 1    Progress note post vaginal delivery    Pt has complaint of headache that is better when she lays flat. Pain is somewhat controlled with current medications. The baby is doing well. Urinary output is adequate. Voiding without difficulty. The patient is ambulating well. Tolerating a regular diet. Vitals:    Patient Vitals for the past 8 hrs:   BP Temp Pulse Resp   18 0324 102/60 98.5 °F (36.9 °C) 86 16     Temp (24hrs), Av.8 °F (37.1 °C), Min:98.5 °F (36.9 °C), Max:99.4 °F (37.4 °C)      Exam:  Patient without distress. Abdomen soft, fundus firm,  nontender               Perineum with normal lochia noted. Lower extremities are negative for swelling, cords or tenderness. Labs:   Recent Results (from the past 24 hour(s))   CBC WITH AUTOMATED DIFF    Collection Time: 18  4:43 PM   Result Value Ref Range    WBC 6.7 3.6 - 11.0 K/uL    RBC 3.53 (L) 3.80 - 5.20 M/uL    HGB 11.0 (L) 11.5 - 16.0 g/dL    HCT 33.5 (L) 35.0 - 47.0 %    MCV 94.9 80.0 - 99.0 FL    MCH 31.2 26.0 - 34.0 PG    MCHC 32.8 30.0 - 36.5 g/dL    RDW 12.3 11.5 - 14.5 %    PLATELET 198 390 - 919 K/uL    MPV 10.1 8.9 - 12.9 FL    NRBC 0.0 0  WBC    ABSOLUTE NRBC 0.00 0.00 - 0.01 K/uL    NEUTROPHILS 68 32 - 75 %    LYMPHOCYTES 20 12 - 49 %    MONOCYTES 8 5 - 13 %    EOSINOPHILS 3 0 - 7 %    BASOPHILS 0 0 - 1 %    IMMATURE GRANULOCYTES 1 (H) 0.0 - 0.5 %    ABS. NEUTROPHILS 4.6 1.8 - 8.0 K/UL    ABS. LYMPHOCYTES 1.3 0.8 - 3.5 K/UL    ABS. MONOCYTES 0.6 0.0 - 1.0 K/UL    ABS. EOSINOPHILS 0.2 0.0 - 0.4 K/UL    ABS. BASOPHILS 0.0 0.0 - 0.1 K/UL    ABS. IMM. GRANS. 0.0 0.00 - 0.04 K/UL    DF AUTOMATED         Assessment:     Status post vaginal delivery. Doing well postpartum day 1.     Plan:     Routine postpartum care

## 2018-12-27 NOTE — L&D DELIVERY NOTE
Delivery Summary    Patient: Jarad Ahumada MRN: [id number]  SSN: [id number]    YOB: 1995  Age: 26 y.o. Sex: female       Information for the patient's :  Madeline Dyer [[id number]]       Labor Events:    Labor: No   Rupture Date: 2018   Rupture Time: 6:05 PM   Rupture Type AROM   Amniotic Fluid Volume:      Amniotic Fluid Description: Clear     Induction:         Augmentation: Oxytocin;AROM   Labor Events:       Cervical Ripening:     None     Delivery Events:  Episiotomy: None   Laceration(s): Second degree perineal     Repaired: Yes    Number of Repair Packets: 2   Suture Type and Size: Chromic 2-0     Estimated Blood Loss (ml): 350ml       Delivery Date: 2018    Delivery Time: 11:22 PM  Delivery Type: Vaginal, Spontaneous  Sex:  Female     Gestational Age: 38w3d   Delivery Clinician:  Matt Jurado  Living Status: Living   Delivery Location: L&D            APGARS  One minute Five minutes Ten minutes   Skin color: 1   1        Heart rate: 2   2        Grimace: 2   2        Muscle tone: 2   2        Breathin   2        Totals: 9   9            Presentation: Vertex    Position:        Resuscitation Method:  Suctioning-bulb; Tactile Stimulation     Meconium Stained: None      Cord Information: 3 Vessels  Complications: Nuchal Cord With Compressions  Cord around:    Delayed cord clamping? Yes  Cord clamped date/time:   Disposition of Cord Blood: Discard    Blood Gases Sent?: No    Placenta:  Date/Time: 2018 11:24 PM  Removal: Spontaneous      Appearance: Normal//Intact/Uterus explored and negative      Measurements:  Birth Weight:        Birth Length:        Head Circumference:        Chest Circumference:       Abdominal Girth:       Other Providers:   Philipp MCCARTY;ALEXANDRE NEFF;RODRIGO THOMAS;JUSTIN WEAVER;Rd WEAVER, Obstetrician;Primary Nurse;Primary  Nurse;Charge Nurse;Scrub Tech           Group B Strep:   Lab Results Component Value Date/Time    GrBStrep, External Negative 2018     Information for the patient's :  Madeline De La Torre Female Deronica [[id number]]   No results found for: ABORH, PCTABR, PCTDIG, BILI, ABORHEXT, ABORH    No results for input(s): PCO2CB, PO2CB, HCO3I, SO2I, IBD, PTEMPI, SPECTI, PHICB, ISITE, IDEV, IALLEN in the last 72 hours.

## 2018-12-28 VITALS
RESPIRATION RATE: 15 BRPM | TEMPERATURE: 97.6 F | WEIGHT: 174 LBS | HEART RATE: 65 BPM | OXYGEN SATURATION: 100 % | BODY MASS INDEX: 28.99 KG/M2 | SYSTOLIC BLOOD PRESSURE: 107 MMHG | HEIGHT: 65 IN | DIASTOLIC BLOOD PRESSURE: 55 MMHG

## 2018-12-28 PROCEDURE — 76010000093 HC SPECIAL PROCEDURE

## 2018-12-28 PROCEDURE — 74011250637 HC RX REV CODE- 250/637: Performed by: OBSTETRICS & GYNECOLOGY

## 2018-12-28 PROCEDURE — 74011250637 HC RX REV CODE- 250/637: Performed by: ANESTHESIOLOGY

## 2018-12-28 RX ORDER — HYDROMORPHONE HYDROCHLORIDE 2 MG/1
2 TABLET ORAL
Status: DISCONTINUED | OUTPATIENT
Start: 2018-12-28 | End: 2018-12-28 | Stop reason: HOSPADM

## 2018-12-28 RX ORDER — HYDROCODONE BITARTRATE AND ACETAMINOPHEN 10; 325 MG/1; MG/1
1 TABLET ORAL
Qty: 22 TAB | Refills: 0 | Status: SHIPPED | OUTPATIENT
Start: 2018-12-28 | End: 2020-06-17

## 2018-12-28 RX ORDER — ONDANSETRON 8 MG/1
8 TABLET, ORALLY DISINTEGRATING ORAL
Qty: 30 TAB | Refills: 2 | Status: SHIPPED | OUTPATIENT
Start: 2018-12-28 | End: 2020-06-17

## 2018-12-28 RX ORDER — BUTALBITAL, ACETAMINOPHEN AND CAFFEINE 50; 325; 40 MG/1; MG/1; MG/1
1 TABLET ORAL
Qty: 30 TAB | Refills: 1 | Status: SHIPPED | OUTPATIENT
Start: 2018-12-28 | End: 2020-07-06

## 2018-12-28 RX ADMIN — HYDROCODONE BITARTRATE AND ACETAMINOPHEN 1 TABLET: 10; 325 TABLET ORAL at 06:49

## 2018-12-28 RX ADMIN — IBUPROFEN 800 MG: 800 TABLET ORAL at 05:29

## 2018-12-28 RX ADMIN — BUTALBITAL, ACETAMINOPHEN AND CAFFEINE 1 TABLET: 50; 325; 40 TABLET ORAL at 05:29

## 2018-12-28 RX ADMIN — ONDANSETRON 8 MG: 4 TABLET, ORALLY DISINTEGRATING ORAL at 13:00

## 2018-12-28 RX ADMIN — HYDROMORPHONE HYDROCHLORIDE 2 MG: 2 TABLET ORAL at 08:11

## 2018-12-28 RX ADMIN — BUTALBITAL, ACETAMINOPHEN AND CAFFEINE 1 TABLET: 50; 325; 40 TABLET ORAL at 11:45

## 2018-12-28 RX ADMIN — HYDROCODONE BITARTRATE AND ACETAMINOPHEN 1 TABLET: 10; 325 TABLET ORAL at 11:45

## 2018-12-28 RX ADMIN — IBUPROFEN 800 MG: 800 TABLET ORAL at 13:38

## 2018-12-28 RX ADMIN — ONDANSETRON 8 MG: 4 TABLET, ORALLY DISINTEGRATING ORAL at 06:49

## 2018-12-28 RX ADMIN — BUTALBITAL, ACETAMINOPHEN AND CAFFEINE 1 TABLET: 50; 325; 40 TABLET ORAL at 00:29

## 2018-12-28 NOTE — PROGRESS NOTES
0715 patient called out to nurses station for RN. RN went in room. Patient crying and stating her pain has not improved at all, and requested RN to call anesthesia. RN Called Anesthesiologist OMID Real. He stated he will come speak to patient. 0800 patient requested dilaudid. Dr. Maxwell Herrera gave verbal orders for 2 mg PO dilaudid. 4145 administered dilaudid at this time. Crystal Barnes, Anesthesiologist in room talking to patient at this time. Patient continues to decline blood patch at this time, but states she will think about it. 
 
0920 during hourly round, patient told RN she would like to have to blood patch. RN called Anesthesia and he said to bring the patient down to PACU in 20 mintues 3014 Patient transported by LENNY Juarez to PACU for blood patch placement. PACU RN states they will bring patient back when the procedure is complete. 1054 patient back on unit. 1257 patient requested a prescription for fioricet and zofran for discharge. Called Dr. Maxwell Herrera and he will write a prescription for both. 1405 Patient off unit in stable condition via wheelchair with volunteers for discharge home per Dr. Maxwell Herrera. Patient is to follow-up in 2-6 weeks and is aware. Prescriptions given to patient. Patient denies any H/A, dizziness, N/V, or pain at this time. Infant in car seat with mother.

## 2018-12-28 NOTE — PROCEDURES
Blood Patch  Date/Time: 12/28/2018 10:21 AM  Performed by: William Mina MD  Authorized by: William Mina MD   Start Time:  12/28/2018 10:03 AM  End Time:  12/28/2018 10:21 AM    Pre-procedure: Indication: spinal headache      Preanesthetic Checklist: patient identified, risks and benefits discussed, anesthesia consent, patient being monitored and timeout performed      Timeout Time:  10:03    Blood Patch:     Location of venous blood draw:  Arm  Patient Position:  Seated  Prep Region:  Lumbar  Prep: Betadine        Location:  L4-5    Injection Technique:  LIZZIE saline  Needle Type:  Tuohy  Needle Gauge:  17 G  Sterile Blood Injected (mL):  20    Assessment:   Attempts:  1      Patient tolerance:  Patient tolerated the procedure well with no immediate complications  Patient two days out from epidural with continued postural headache. Patient wishes to proceed with blood patch at this time.

## 2018-12-28 NOTE — LACTATION NOTE
This note was copied from a baby's chart. Mother resting in bed. Mother states she has continued to have a headache and not feeling well. Mother has not pumped and attempted to BF once. Encouraged mother to call out for assistance with feeding baby at breast prior to discharge. Lactogenesis reviewed. Reinforced that any breast milk is beneficial to baby. Pt will successfully establish breastfeeding by feeding in response to early feeding cues  
or wake every 3h, will obtain deep latch, and will keep log of feedings/output. Taught to BF at hunger cues and or q 2-3 hrs and to offer 10-20 drops of hand expressed colostrum at any non-feeds. Breast Assessment Left Breast: Small Left Nipple: Everted, Intact Right Breast: Small Right Nipple: Everted, Intact Breast- Feeding Assessment Attends Breast-Feeding Classes: No 
Breast-Feeding Experience: No 
Breast Trauma/Surgery: No 
Type/Quality: (formula feeding at this time) Lactation Consultant Visits Breast-Feedings: Not breast-feeding Mother/Infant Observation Mother Observation: Breast comfortable

## 2018-12-28 NOTE — DISCHARGE SUMMARY
Obstetrical Discharge Summary     Name: Saida Schroeder MRN: [id number]  SSN: [id number]    YOB: 1995  Age: 26 y.o. Sex: female      Admit Date: 2018    Discharge Date: 2018     Admitting Physician: Tao Aviles MD     Attending Physician:  Gertrude Varela MD     Admission Diagnoses: Pregnancy; Normal delivery    Discharge Diagnoses:   Information for the patient's :  Female, Saida Schroeder [[id number]]   Delivery of a 6 lb 9.5 oz (2.99 kg) female infant via Vaginal, Spontaneous on 2018 at 5:20 PM  by . Apgars were 9 and 9. Additional Diagnoses:   Hospital Problems  Never Reviewed          Codes Class Noted POA    Normal delivery ICD-10-CM: O80  ICD-9-CM: 646  2018 Unknown             Lab Results   Component Value Date/Time    Rubella, External 3.01 2018    GrBStrep, External Negative 2018       Immunization(s):   Immunization History   Administered Date(s) Administered    Influenza Vaccine (Quad) PF 10/09/2018    Tdap 10/09/2018        Hospital Course: Postpartum course was complicated by spinal headache, which added 0 days to the patient's length of stay. The patient was released to her home in good condition. Patient Instructions:     Reference my discharge instructions.       Follow-up Appointments   Procedures    FOLLOW UP VISIT Appointment in: 6 Weeks     Standing Status:   Standing     Number of Occurrences:   1     Order Specific Question:   Appointment in     Answer:   6 Weeks        Signed By:  Tao Aviles MD     2018

## 2018-12-28 NOTE — ROUTINE PROCESS
Bedside and Verbal shift change report given to Curtis Colvin RN (oncoming nurse) by Leydi Singh RN (offgoing nurse). Report given with SBAR, Kardex, Intake/Output and MAR.

## 2018-12-28 NOTE — DISCHARGE INSTRUCTIONS
POST VAGINAL DELIVERY DISCHARGE INSTRUCTIONS    Name: Vale Carlisle  YOB: 1995  Primary Diagnosis: Active Problems:    Normal delivery (12/27/2018)        General:     Diet/Diet Restrictions:  Drink plenty of fluids daily (water, juices); avoid excessive caffeine intake. Eat and drink your normal diet. Medications:   See list    Physical Activity / Restrictions / Safety:     Avoid heavy lifting, no more that 15 lbs. For 2-3 weeks; may use of stairs with assistance first few times. No driving until no pain and off pain meds with narcotics. Avoid intercourse 4-6 weeks, no douching or tampon use. You may walk but check with obstetrician before starting or resuming an exercise program.         Discharge Instructions/Special Treatment/Home Care Needs:     Continue prenatal vitamins. Continue to use squirt bottle with warm water on your episiotomy for comfort after each bathroom use as desired. Call the office for the following:     Fever over 101 degrees by mouth. Vaginal bleeding heavier than a normal menstrual period or clots larger than a golf ball. Red streaks or increased swelling of legs, painful red streaks on your breast.  Painful urination, constipation and increased pain or swelling or discharge with your incision. Pain Management:     Pain Management:   Take Acetaminophen (Tylenol) or Ibuprofen (Advil, Motrin), as directed for pain. Use a warm Sitz bath 3 times daily to relieve episiotomy or hemorrhoidal discomfort. For hemorrhoidal discomfort, use Tucks and Anusol cream as needed and directed.     Follow-Up Care:     Appointment with MD:   Follow-up Appointments   Procedures    FOLLOW UP VISIT Appointment in: 6 Weeks     Standing Status:   Standing     Number of Occurrences:   1     Order Specific Question:   Appointment in     Answer:   Juan Harris     Telephone number: [phone]      Signed By: Lawyer Manpreet MD Date: 12/28/2018 Time: 7:57 AM

## 2018-12-28 NOTE — PERIOP NOTES
Patient transported back to [facility] room 311 on stretcher. Report given to IBRAHIMA Parikh. No meds given. Patient in bed at 30 degrees.

## 2018-12-28 NOTE — PROGRESS NOTES
Post-Partum Day Number 2 Progress note post vaginal delivery Patient doing well post-partum without significant complaint other than HA. Voiding without difficulty, normal lochia, positive flatus. Vitals:   
Patient Vitals for the past 8 hrs: 
 BP Temp Pulse Resp  
18 0538 107/55 97.6 °F (36.4 °C) 65 15  
18 0054 110/56  74 18 Temp (24hrs), Av.9 °F (36.6 °C), Min:97.6 °F (36.4 °C), Max:98.4 °F (36.9 °C) Vital signs stable, afebrile. Exam:  Patient without distress. Abdomen soft, fundus firm,  nontender Perineum with normal lochia noted. Lower extremities are negative for swelling, cords or tenderness. Labs: No results found for this or any previous visit (from the past 24 hour(s)). Assessment:  
 
Status post: vaginal delivery. Doing well postpartum day 2 except for spinal HA. Plan:  
 
Routine postpartum care. Encouraged to do blood patch. Plan discharge for today with follow up in our office in 6 weeks. See discharge summary.

## 2018-12-31 ENCOUNTER — TELEPHONE (OUTPATIENT)
Dept: OBGYN CLINIC | Age: 23
End: 2018-12-31

## 2018-12-31 NOTE — TELEPHONE ENCOUNTER
Call received at 650am    26year old delivered on 12/26/18. Patient calling to say that has a blood patch due to spinal headache. Patient states the medication she was given has not helped and she has been in the bed since getting home on Friday. 12/28/18. Patient advised per Dr. Aj Plascencia to call Labor and Delivery at  and ask to speak to the charge nurse. Patient verbalized understanding.

## 2019-01-06 ENCOUNTER — HOSPITAL ENCOUNTER (EMERGENCY)
Age: 24
Discharge: HOME | End: 2019-01-06
Attending: OBSTETRICS & GYNECOLOGY | Admitting: OBSTETRICS & GYNECOLOGY
Payer: COMMERCIAL

## 2019-01-06 ENCOUNTER — HOSPITAL ENCOUNTER (EMERGENCY)
Age: 24
Discharge: STILL ADMITTED OR ADMINISTRATIVE | End: 2019-01-06
Attending: EMERGENCY MEDICINE
Payer: MEDICAID

## 2019-01-06 ENCOUNTER — ANESTHESIA EVENT (OUTPATIENT)
Dept: LABOR AND DELIVERY | Age: 24
End: 2019-01-06
Payer: COMMERCIAL

## 2019-01-06 ENCOUNTER — ANESTHESIA (OUTPATIENT)
Dept: LABOR AND DELIVERY | Age: 24
End: 2019-01-06
Payer: COMMERCIAL

## 2019-01-06 VITALS
OXYGEN SATURATION: 99 % | HEART RATE: 49 BPM | SYSTOLIC BLOOD PRESSURE: 120 MMHG | RESPIRATION RATE: 18 BRPM | TEMPERATURE: 97.9 F | DIASTOLIC BLOOD PRESSURE: 75 MMHG

## 2019-01-06 LAB
ALBUMIN SERPL-MCNC: 3.5 G/DL (ref 3.5–5)
ALBUMIN/GLOB SERPL: 0.9 {RATIO} (ref 1.1–2.2)
ALP SERPL-CCNC: 97 U/L (ref 45–117)
ALT SERPL-CCNC: 38 U/L (ref 12–78)
ANION GAP SERPL CALC-SCNC: 10 MMOL/L (ref 5–15)
AST SERPL-CCNC: 16 U/L (ref 15–37)
BASOPHILS # BLD: 0 K/UL (ref 0–0.1)
BASOPHILS NFR BLD: 1 % (ref 0–1)
BILIRUB SERPL-MCNC: 0.2 MG/DL (ref 0.2–1)
BUN SERPL-MCNC: 7 MG/DL (ref 6–20)
BUN/CREAT SERPL: 11 (ref 12–20)
CALCIUM SERPL-MCNC: 9 MG/DL (ref 8.5–10.1)
CHLORIDE SERPL-SCNC: 105 MMOL/L (ref 97–108)
CO2 SERPL-SCNC: 27 MMOL/L (ref 21–32)
CREAT SERPL-MCNC: 0.65 MG/DL (ref 0.55–1.02)
DIFFERENTIAL METHOD BLD: ABNORMAL
EOSINOPHIL # BLD: 0.3 K/UL (ref 0–0.4)
EOSINOPHIL NFR BLD: 5 % (ref 0–7)
ERYTHROCYTE [DISTWIDTH] IN BLOOD BY AUTOMATED COUNT: 12.4 % (ref 11.5–14.5)
GLOBULIN SER CALC-MCNC: 3.8 G/DL (ref 2–4)
GLUCOSE SERPL-MCNC: 89 MG/DL (ref 65–100)
HCT VFR BLD AUTO: 37.2 % (ref 35–47)
HGB BLD-MCNC: 12.1 G/DL (ref 11.5–16)
IMM GRANULOCYTES # BLD: 0 K/UL (ref 0–0.04)
IMM GRANULOCYTES NFR BLD AUTO: 0 % (ref 0–0.5)
LYMPHOCYTES # BLD: 1.4 K/UL (ref 0.8–3.5)
LYMPHOCYTES NFR BLD: 25 % (ref 12–49)
MCH RBC QN AUTO: 30.9 PG (ref 26–34)
MCHC RBC AUTO-ENTMCNC: 32.5 G/DL (ref 30–36.5)
MCV RBC AUTO: 95.1 FL (ref 80–99)
MONOCYTES # BLD: 0.3 K/UL (ref 0–1)
MONOCYTES NFR BLD: 6 % (ref 5–13)
NEUTS SEG # BLD: 3.5 K/UL (ref 1.8–8)
NEUTS SEG NFR BLD: 64 % (ref 32–75)
NRBC # BLD: 0 K/UL (ref 0–0.01)
NRBC BLD-RTO: 0 PER 100 WBC
PLATELET # BLD AUTO: 416 K/UL (ref 150–400)
PMV BLD AUTO: 9.1 FL (ref 8.9–12.9)
POTASSIUM SERPL-SCNC: 4.1 MMOL/L (ref 3.5–5.1)
PROT SERPL-MCNC: 7.3 G/DL (ref 6.4–8.2)
RBC # BLD AUTO: 3.91 M/UL (ref 3.8–5.2)
SODIUM SERPL-SCNC: 142 MMOL/L (ref 136–145)
WBC # BLD AUTO: 5.5 K/UL (ref 3.6–11)

## 2019-01-06 PROCEDURE — 36415 COLL VENOUS BLD VENIPUNCTURE: CPT

## 2019-01-06 PROCEDURE — 96374 THER/PROPH/DIAG INJ IV PUSH: CPT | Performed by: ANESTHESIOLOGY

## 2019-01-06 PROCEDURE — 74011250636 HC RX REV CODE- 250/636

## 2019-01-06 PROCEDURE — 74011250636 HC RX REV CODE- 250/636: Performed by: ANESTHESIOLOGY

## 2019-01-06 PROCEDURE — 96375 TX/PRO/DX INJ NEW DRUG ADDON: CPT | Performed by: ANESTHESIOLOGY

## 2019-01-06 PROCEDURE — 80053 COMPREHEN METABOLIC PANEL: CPT

## 2019-01-06 PROCEDURE — 85025 COMPLETE CBC W/AUTO DIFF WBC: CPT

## 2019-01-06 PROCEDURE — 62273 INJECT EPIDURAL PATCH: CPT

## 2019-01-06 PROCEDURE — 75810000275 HC EMERGENCY DEPT VISIT NO LEVEL OF CARE

## 2019-01-06 RX ORDER — ONDANSETRON 2 MG/ML
4 INJECTION INTRAMUSCULAR; INTRAVENOUS ONCE
Status: COMPLETED | OUTPATIENT
Start: 2019-01-06 | End: 2019-01-06

## 2019-01-06 RX ORDER — KETOROLAC TROMETHAMINE 30 MG/ML
30 INJECTION, SOLUTION INTRAMUSCULAR; INTRAVENOUS
Status: COMPLETED | OUTPATIENT
Start: 2019-01-06 | End: 2019-01-06

## 2019-01-06 RX ORDER — LIDOCAINE HYDROCHLORIDE 20 MG/ML
INJECTION, SOLUTION EPIDURAL; INFILTRATION; INTRACAUDAL; PERINEURAL
Status: COMPLETED | OUTPATIENT
Start: 2019-01-06 | End: 2019-01-06

## 2019-01-06 RX ADMIN — METHYLPREDNISOLONE SODIUM SUCCINATE 125 MG: 125 INJECTION, POWDER, FOR SOLUTION INTRAMUSCULAR; INTRAVENOUS at 22:04

## 2019-01-06 RX ADMIN — LIDOCAINE HYDROCHLORIDE 10 ML: 20 INJECTION, SOLUTION EPIDURAL; INFILTRATION; INTRACAUDAL; PERINEURAL at 21:12

## 2019-01-06 RX ADMIN — ONDANSETRON 4 MG: 2 INJECTION INTRAMUSCULAR; INTRAVENOUS at 22:04

## 2019-01-06 RX ADMIN — KETOROLAC TROMETHAMINE 30 MG: 30 INJECTION, SOLUTION INTRAMUSCULAR at 22:04

## 2019-01-07 NOTE — ANESTHESIA PREPROCEDURE EVALUATION
Anesthetic History No history of anesthetic complications Review of Systems / Medical History Patient summary reviewed, nursing notes reviewed and pertinent labs reviewed Pulmonary Within defined limits Asthma Neuro/Psych Within defined limits Cardiovascular Within defined limits Exercise tolerance: >4 METS 
  
GI/Hepatic/Renal 
Within defined limits Endo/Other Within defined limits Other Findings Comments: Anxiety Post-dural puncture headache Physical Exam 
 
Airway Mallampati: II 
 
Neck ROM: normal range of motion Mouth opening: Normal 
 
 Cardiovascular Regular rate and rhythm,  S1 and S2 normal,  no murmur, click, rub, or gallop Rhythm: regular Rate: normal 
 
 
 
 Dental 
No notable dental hx Pulmonary Breath sounds clear to auscultation Abdominal 
GI exam deferred Other Findings Anesthetic Plan ASA: 2 Anesthesia type: epidural 
 
 
 
 
Induction: Intravenous Anesthetic plan and risks discussed with: Patient Pt had a vaginal delivery 12/26 with subsequent postural headache. Received a blood patch prior to leaving the hospital. Called today reporting return of headache symptoms. Discussed risks of performing another blood patch including infection and further dural punctures. Pt wishes to proceed. See epidural note for procedural details.

## 2019-01-07 NOTE — ANESTHESIA POSTPROCEDURE EVALUATION
* No procedures listed *. Anesthesia Post Evaluation Multimodal analgesia: multimodal analgesia not used between 6 hours prior to anesthesia start to PACU discharge Patient location during evaluation: bedside Patient participation: complete - patient participated Level of consciousness: awake Pain management: adequate Airway patency: patent Anesthetic complications: no 
Cardiovascular status: acceptable Respiratory status: acceptable Hydration status: acceptable Comments: Pt laid flat for 30 minutes post procedure then up and walking. Pt reports no relief from symptoms. Discussed options with pt - now at 10 days from headache onset. Advised her to wait until 14 days for spontaneous resolution of symptoms. At that point if headache persists would recommend involving primary care and neurology to rule out other causes of headache prior to considering a third blood patch. Will follow up with patient later in the week. Pt has anesthesia office and hospital phone numbers in case of further questions. Will discharge to home tonight with instructions to watch for signs of infection at epidural site including fever and localized pain. Visit Vitals /62 (BP 1 Location: Left arm) Pulse (!) 55 Temp 36.6 °C (97.9 °F) Resp 18 SpO2 99%

## 2019-01-07 NOTE — PROGRESS NOTES
1945:  The patient presents to the hospital with a severe headache since delivery on 12/26/18. She received a blood patch before discharge but states the headache returned 12/29/18. The patient denies blurred vision, chest pain, or shortness of breath. The patient has no history of elevated blood pressures or pre-eclampsia.

## 2019-01-07 NOTE — ANESTHESIA PROCEDURE NOTES
Blood Patch Performed by: Earnestine Mejía MD 
Authorized by: Earnestine Mejía MD  
Start Time:  1/6/2019 8:48 PM 
End Time:  1/6/2019 9:12 PM 
 
Pre-procedure: Indication: spinal headache Preanesthetic Checklist: patient identified, risks and benefits discussed, anesthesia consent, patient being monitored and timeout performed Timeout Time:  20:48 Blood Patch:  
 
Location of venous blood draw:  Arm Patient Position:  Seated Prep Region:  Lumbar Prep: Betadine Location:  L3-4 Injection Technique:  LIZZIE air Needle Type:  Tuohy Needle Gauge:  18 G Sterile Blood Injected (mL):  20 Assessment:  
Attempts:  1 Events: injection painful Patient tolerance:  Patient tolerated the procedure well with no immediate complications

## 2019-01-07 NOTE — PROGRESS NOTES
2035- Assumed care of patient. Bedside report received. 2048- Dr Ronald Bird at bedside for blood patch. Consents are signed. 2130- No relief after blood patch. Orders for IV Toradol, solumedrol, and Zofran. 2245- No relief with IV medications. Discharge instructions provided to patient and verbalized understanding. Pt off unit via wheel chair.

## 2019-01-07 NOTE — DISCHARGE INSTRUCTIONS
Patient Education   Patient Education   Patient Education        Blood Patch: Care Instructions  Your Care Instructions    A blood patch is a procedure that uses your own blood to help your headache. Headaches may happen after certain procedures that involve the spine, such as a myelogram, a spinal tap, or an epidural for anesthesia. In these procedures, the needle that is used sometimes causes a bit of spinal fluid to leak out of the space around your spinal cord. The leak usually isn't dangerous. But if enough fluid leaks out, it changes the pressure around your spinal cord, and that can cause a very bad headache. To apply a blood patch, your doctor takes blood from your arm and injects it into the area of your lower back where the leak happened. The blood restores the pressure around your spinal cord. It also helps seal any leak that may still be there. Many people feel better right away, but it could take a day or two. And a few people need to have a second blood patch. Follow-up care is a key part of your treatment and safety. Be sure to make and go to all appointments, and call your doctor if you are having problems. It's also a good idea to know your test results and keep a list of the medicines you take. How can you care for yourself at home? · For the first 24 hours, return to your regular activities slowly. Avoid strenuous activity. · If the injection site is sore, put ice or a cold pack on the area for 10 to 20 minutes at a time. Put a thin cloth between the ice and your skin. When should you call for help?   Call your doctor now or seek immediate medical care if:    · You have a fever.     · You have a new or worse headache.     · You have a stiff neck.     · You have drainage or bleeding from the puncture site.     · You have tingling, weakness, or numbness in your legs.     · You have trouble urinating or can pass only very small amounts of urine.    Watch closely for changes in your health, and be sure to contact your doctor if:    · You do not get better as expected. Where can you learn more? Go to http://alberto-brady.info/. Simone Hale in the search box to learn more about \"Blood Patch: Care Instructions. \"  Current as of: March 29, 2018  Content Version: 11.8  © 0970-5608 [email or web address]. Care instructions adapted under license by [employer] (which disclaims liability or warranty for this information). If you have questions about a medical condition or this instruction, always ask your healthcare professional. [address] any warranty or liability for your use of this information. Headache: Care Instructions  Your Care Instructions    Headaches have many possible causes. Most headaches aren't a sign of a more serious problem, and they will get better on their own. Home treatment may help you feel better faster. The doctor has checked you carefully, but problems can develop later. If you notice any problems or new symptoms, get medical treatment right away. Follow-up care is a key part of your treatment and safety. Be sure to make and go to all appointments, and call your doctor if you are having problems. It's also a good idea to know your test results and keep a list of the medicines you take. How can you care for yourself at home? · Do not drive if you have taken a prescription pain medicine. · Rest in a quiet, dark room until your headache is gone. Close your eyes and try to relax or go to sleep. Don't watch TV or read. · Put a cold, moist cloth or cold pack on the painful area for 10 to 20 minutes at a time. Put a thin cloth between the cold pack and your skin. · Use a warm, moist towel or a heating pad set on low to relax tight shoulder and neck muscles. · Have someone gently massage your neck and shoulders. · Take pain medicines exactly as directed.   ? If the doctor gave you a prescription medicine for pain, take it as prescribed. ? If you are not taking a prescription pain medicine, ask your doctor if you can take an over-the-counter medicine. · Be careful not to take pain medicine more often than the instructions allow, because you may get worse or more frequent headaches when the medicine wears off. · Do not ignore new symptoms that occur with a headache, such as a fever, weakness or numbness, vision changes, or confusion. These may be signs of a more serious problem. To prevent headaches  · Keep a headache diary so you can figure out what triggers your headaches. Avoiding triggers may help you prevent headaches. Record when each headache began, how long it lasted, and what the pain was like (throbbing, aching, stabbing, or dull). Write down any other symptoms you had with the headache, such as nausea, flashing lights or dark spots, or sensitivity to bright light or loud noise. Note if the headache occurred near your period. List anything that might have triggered the headache, such as certain foods (chocolate, cheese, wine) or odors, smoke, bright light, stress, or lack of sleep. · Find healthy ways to deal with stress. Headaches are most common during or right after stressful times. Take time to relax before and after you do something that has caused a headache in the past.  · Try to keep your muscles relaxed by keeping good posture. Check your jaw, face, neck, and shoulder muscles for tension, and try relaxing them. When sitting at a desk, change positions often, and stretch for 30 seconds each hour. · Get plenty of sleep and exercise. · Eat regularly and well. Long periods without food can trigger a headache. · Treat yourself to a massage. Some people find that regular massages are very helpful in relieving tension. · Limit caffeine by not drinking too much coffee, tea, or soda. But don't quit caffeine suddenly, because that can also give you headaches.   · Reduce eyestrain from computers by blinking frequently and looking away from the computer screen every so often. Make sure you have proper eyewear and that your monitor is set up properly, about an arm's length away. · Seek help if you have depression or anxiety. Your headaches may be linked to these conditions. Treatment can both prevent headaches and help with symptoms of anxiety or depression. When should you call for help? Call 911 anytime you think you may need emergency care. For example, call if:    · You have signs of a stroke. These may include:  ? Sudden numbness, paralysis, or weakness in your face, arm, or leg, especially on only one side of your body. ? Sudden vision changes. ? Sudden trouble speaking. ? Sudden confusion or trouble understanding simple statements. ? Sudden problems with walking or balance. ? A sudden, severe headache that is different from past headaches.    Call your doctor now or seek immediate medical care if:    · You have a new or worse headache.     · Your headache gets much worse. Where can you learn more? Go to http://alberto-brady.info/. Enter M271 in the search box to learn more about \"Headache: Care Instructions. \"  Current as of: June 4, 2018  Content Version: 11.8  © 9715-3638 [employer]. Care instructions adapted under license by [employer] (which disclaims liability or warranty for this information). If you have questions about a medical condition or this instruction, always ask your healthcare professional. [place] [address] any warranty or liability for your use of this information. Learning About Spinal Headaches  What is a spinal headache? Headaches may happen after certain procedures that involve the spine. These procedures include myelograms, spinal taps, and epidurals for anesthesia. In these procedures, a bit of spinal fluid may leak out of the space around the spinal cord. The leak usually isn't dangerous.  But if enough fluid leaks out, it changes the pressure around your spinal cord. That can cause a headache. Many people who have a spinal procedure don't get a headache afterwards. For the people who do, the headache can be relieved by self-care at home. It usually goes away in a few days. What are the symptoms? A spinal headache usually starts in the first few days after the procedure that caused it. You may feel a dull, throbbing pain. It can start in the front or back of the head, and you may feel it down into your neck and shoulders. The headache may get worse when you move your head or when you sit or stand. It should ease when you lie down. You may feel dizzy or sick to your stomach. You may have pain in your lower back. And you may hear a ringing in your ears. Even if your symptoms are mild, tell your doctor if they last for more than 2 or 3 days. If you have a more severe headache, make sure to call your doctor. How are they treated? A mild spinal headache can be relieved by self-care at home. It usually goes away in a few days. A good first step is to lie down in a quiet, dark room until the headache is gone. If your doctor agrees, you can take over-the-counter pain medicine. Be sure to follow the directions on the label. Don't forget to drink liquids to keep your body hydrated. Avoid drinks with caffeine and alcohol. They won't help your body stay hydrated. And they may make your headache worse. If your home treatment doesn't relieve the headache, talk to your doctor about getting treated with a blood patch. This procedure uses your own blood to help your headache. To apply a blood patch, your doctor takes blood from your arm and injects it into the area of your lower back where the leak happened. The blood restores the pressure around your spinal cord. It also helps seal any leak that may still be there. Many people feel better right away, but it could take a day or two.  And a few people need to have a second blood patch. Follow-up care is a key part of your treatment and safety. Be sure to make and go to all appointments, and call your doctor if you are having problems. It's also a good idea to know your test results and keep a list of the medicines you take. Where can you learn more? Go to http://alberto-brady.info/. Enter K029 in the search box to learn more about \"Learning About Spinal Headaches. \"  Current as of: March 29, 2018  Content Version: 11.8  © 6958-9326 [employer]. Care instructions adapted under license by [employer] (which disclaims liability or warranty for this information). If you have questions about a medical condition or this instruction, always ask your healthcare professional. [address] any warranty or liability for your use of this information.

## 2019-01-07 NOTE — H&P
History & Physical 
 
Name: Oniel Pugh MRN: [id number]  SSN: [id number] YOB: 1995  Age: 26 y.o. Sex: female Subjective:  
 
Estimated Date of Delivery: None noted. OB History  Para Term  AB Living 5 1 1   2 1 SAB TAB Ectopic Molar Multiple Live Births 2         1 Ms. aSurabh Downing presents to [facility] status post vaginal delivery 2018 with persistent headache exacerbated when sitting; some relief noted when laying down. Immediate postpartum after vaginal delivery, she reported having an epidural headache and received blood patch with positive response. She reports mild nausea, but denies any neurological weakness nor deficits. Prenatal course was uncomplicated. Please see prenatal records for details. Past Medical History:  
Diagnosis Date  Asthma  Phlebitis and thrombophlebitis   
 vulvar varicosity  Psychiatric disorder   
 anxiety Past Surgical History:  
Procedure Laterality Date  HX OTHER SURGICAL    
 keloid x3 removal on ear Social History Occupational History  Not on file Tobacco Use  Smoking status: Never Smoker  Smokeless tobacco: Never Used Substance and Sexual Activity  Alcohol use: No  
 Drug use: No  
 Sexual activity: Yes  
  Partners: Male Birth control/protection: None No family history on file. Allergies Allergen Reactions  Pecan Nut Swelling Itching, numbness inside the mouth, swelling of the face  [place] Swelling Prior to Admission medications Medication Sig Start Date End Date Taking? Authorizing Provider HYDROcodone-acetaminophen (NORCO)  mg tablet Take 1 Tab by mouth every six (6) hours as needed. Max Daily Amount: 4 Tabs. 18   Dilcia Bhakta MD  
ondansetron (ZOFRAN ODT) 8 mg disintegrating tablet Take 1 Tab by mouth every eight (8) hours as needed for Nausea.  18   Dilcia Bhakta MD  
 butalbital-acetaminophen-caffeine (FIORICET, ESGIC) -40 mg per tablet Take 1 Tab by mouth every six (6) hours as needed for Pain. 12/28/18   Kellie Terry MD  
raNITIdine (ZANTAC) 150 mg tablet Take 150 mg by mouth two (2) times a day. Provider, Historical  
loratadine 10 mg Cap Take  by mouth daily. 10 mg daily     Other, MD Sultana  
  
 
A comprehensive review of systems was negative except for that written in the HPI. Objective:  
 
Vitals: 
Vital signs stable Physical Exam  
Gen: uncomfortable Pulm: CTA B/L 
CV: S1S2 RRR Abd: Post Gravid uterus, Fundus below umbilicus Pelvic: Minimal lochia Extr: tr edema LE Prenatal Labs:  
Lab Results Component Value Date/Time  
 Rubella, External 3.01 07/19/2018 GrBStrep, External Negative 12/04/2018 HBsAg, External Negative 07/19/2018 HIV, External Negative 07/19/2018 Gonorrhea, External Negative 07/19/2018 Chlamydia, External Negative 07/19/2018 Impression/Plan: Postpartum Headache, Nausea Plan: IV hydration, CBC, CMP, Consult anesthesia to further evaluate for blood patch. Antiemetics prn. If labs stable, will defer to anesthesia. Maintain adequate hydration and use of OTC analgesics prn and keep 6 week postpartum exam scheduled. Signed By:  Alicia Palma MD   
 January 6, 2019

## 2019-05-02 ENCOUNTER — TELEPHONE (OUTPATIENT)
Dept: OBGYN CLINIC | Age: 24
End: 2019-05-02

## 2019-05-02 NOTE — TELEPHONE ENCOUNTER
Dr Cecilio Wagoner requested we give the patient a call to check to see how she is doing since she had an epidural blood patch placed in January- anesthesiologists who saw her have asked Dr Cecilio Wagoner about her. Tried calling cell phone number-went straight to voicemail-left a message for her to call us back. Called home # and left voice message for her to call us back.

## 2019-06-09 NOTE — ED TRIAGE NOTES
Triage: Has a known allergy to Walnuts and ate something with pecans in it. Reports facial swelling, itchy throat felt like the inside of her mouth was numb. Took 3 teaspoons of benadryl ( 37mg) prior to arrival was taken at 10:00. Patient is also 26 weeks pregnant. DC instructions

## 2020-05-28 ENCOUNTER — HOSPITAL ENCOUNTER (EMERGENCY)
Age: 25
Discharge: HOME | End: 2020-05-28
Attending: EMERGENCY MEDICINE
Payer: COMMERCIAL

## 2020-05-28 VITALS
OXYGEN SATURATION: 98 % | DIASTOLIC BLOOD PRESSURE: 62 MMHG | SYSTOLIC BLOOD PRESSURE: 114 MMHG | BODY MASS INDEX: 32.45 KG/M2 | WEIGHT: 201.94 LBS | TEMPERATURE: 99.2 F | HEIGHT: 66 IN | RESPIRATION RATE: 16 BRPM | HEART RATE: 99 BPM

## 2020-05-28 DIAGNOSIS — E86.0 DEHYDRATION: ICD-10-CM

## 2020-05-28 DIAGNOSIS — O21.0 HYPEREMESIS GRAVIDARUM: Primary | ICD-10-CM

## 2020-05-28 DIAGNOSIS — B37.41 YEAST CYSTITIS: ICD-10-CM

## 2020-05-28 DIAGNOSIS — N39.0 URINARY TRACT INFECTION WITH HEMATURIA, SITE UNSPECIFIED: ICD-10-CM

## 2020-05-28 DIAGNOSIS — R31.9 URINARY TRACT INFECTION WITH HEMATURIA, SITE UNSPECIFIED: ICD-10-CM

## 2020-05-28 LAB
ALBUMIN SERPL-MCNC: 3.5 G/DL (ref 3.5–5)
ALBUMIN/GLOB SERPL: 0.8 {RATIO} (ref 1.1–2.2)
ALP SERPL-CCNC: 49 U/L (ref 45–117)
ALT SERPL-CCNC: 29 U/L (ref 12–78)
ANION GAP SERPL CALC-SCNC: 10 MMOL/L (ref 5–15)
APPEARANCE UR: ABNORMAL
AST SERPL-CCNC: 25 U/L (ref 15–37)
BACTERIA URNS QL MICRO: ABNORMAL /HPF
BASOPHILS # BLD: 0 K/UL (ref 0–0.1)
BASOPHILS NFR BLD: 0 % (ref 0–1)
BILIRUB SERPL-MCNC: 0.2 MG/DL (ref 0.2–1)
BILIRUB UR QL: NEGATIVE
BUN SERPL-MCNC: 11 MG/DL (ref 6–20)
BUN/CREAT SERPL: 21 (ref 12–20)
CALCIUM SERPL-MCNC: 8.5 MG/DL (ref 8.5–10.1)
CHLORIDE SERPL-SCNC: 100 MMOL/L (ref 97–108)
CO2 SERPL-SCNC: 25 MMOL/L (ref 21–32)
COLOR UR: ABNORMAL
COMMENT, HOLDF: NORMAL
CREAT SERPL-MCNC: 0.53 MG/DL (ref 0.55–1.02)
DIFFERENTIAL METHOD BLD: ABNORMAL
EOSINOPHIL # BLD: 0.4 K/UL (ref 0–0.4)
EOSINOPHIL NFR BLD: 4 % (ref 0–7)
EPITH CASTS URNS QL MICRO: ABNORMAL /LPF
ERYTHROCYTE [DISTWIDTH] IN BLOOD BY AUTOMATED COUNT: 15.1 % (ref 11.5–14.5)
GLOBULIN SER CALC-MCNC: 4.2 G/DL (ref 2–4)
GLUCOSE SERPL-MCNC: 79 MG/DL (ref 65–100)
GLUCOSE UR STRIP.AUTO-MCNC: NEGATIVE MG/DL
HCT VFR BLD AUTO: 36.6 % (ref 35–47)
HGB BLD-MCNC: 11.5 G/DL (ref 11.5–16)
HGB UR QL STRIP: NEGATIVE
IMM GRANULOCYTES # BLD AUTO: 0 K/UL (ref 0–0.04)
IMM GRANULOCYTES NFR BLD AUTO: 1 % (ref 0–0.5)
KETONES UR QL STRIP.AUTO: 40 MG/DL
LEUKOCYTE ESTERASE UR QL STRIP.AUTO: NEGATIVE
LYMPHOCYTES # BLD: 1.3 K/UL (ref 0.8–3.5)
LYMPHOCYTES NFR BLD: 15 % (ref 12–49)
MCH RBC QN AUTO: 28.1 PG (ref 26–34)
MCHC RBC AUTO-ENTMCNC: 31.4 G/DL (ref 30–36.5)
MCV RBC AUTO: 89.5 FL (ref 80–99)
MONOCYTES # BLD: 0.6 K/UL (ref 0–1)
MONOCYTES NFR BLD: 6 % (ref 5–13)
MUCOUS THREADS URNS QL MICRO: ABNORMAL /LPF
NEUTS SEG # BLD: 6.5 K/UL (ref 1.8–8)
NEUTS SEG NFR BLD: 73 % (ref 32–75)
NITRITE UR QL STRIP.AUTO: NEGATIVE
NRBC # BLD: 0 K/UL (ref 0–0.01)
NRBC BLD-RTO: 0 PER 100 WBC
PH UR STRIP: 6.5 [PH] (ref 5–8)
PLATELET # BLD AUTO: 330 K/UL (ref 150–400)
PMV BLD AUTO: 9.9 FL (ref 8.9–12.9)
POTASSIUM SERPL-SCNC: 3.8 MMOL/L (ref 3.5–5.1)
PROT SERPL-MCNC: 7.7 G/DL (ref 6.4–8.2)
PROT UR STRIP-MCNC: NEGATIVE MG/DL
RBC # BLD AUTO: 4.09 M/UL (ref 3.8–5.2)
RBC #/AREA URNS HPF: ABNORMAL /HPF (ref 0–5)
SAMPLES BEING HELD,HOLD: NORMAL
SODIUM SERPL-SCNC: 135 MMOL/L (ref 136–145)
SP GR UR REFRACTOMETRY: >1.03 (ref 1–1.03)
UA: UC IF INDICATED,UAUC: ABNORMAL
UROBILINOGEN UR QL STRIP.AUTO: 0.2 EU/DL (ref 0.2–1)
WBC # BLD AUTO: 8.9 K/UL (ref 3.6–11)
WBC URNS QL MICRO: ABNORMAL /HPF (ref 0–4)
YEAST URNS QL MICRO: PRESENT

## 2020-05-28 PROCEDURE — 96375 TX/PRO/DX INJ NEW DRUG ADDON: CPT

## 2020-05-28 PROCEDURE — 96374 THER/PROPH/DIAG INJ IV PUSH: CPT

## 2020-05-28 PROCEDURE — 74011250636 HC RX REV CODE- 250/636: Performed by: EMERGENCY MEDICINE

## 2020-05-28 PROCEDURE — 80053 COMPREHEN METABOLIC PANEL: CPT

## 2020-05-28 PROCEDURE — 36415 COLL VENOUS BLD VENIPUNCTURE: CPT

## 2020-05-28 PROCEDURE — 96361 HYDRATE IV INFUSION ADD-ON: CPT

## 2020-05-28 PROCEDURE — 85025 COMPLETE CBC W/AUTO DIFF WBC: CPT

## 2020-05-28 PROCEDURE — 81001 URINALYSIS AUTO W/SCOPE: CPT

## 2020-05-28 PROCEDURE — 99284 EMERGENCY DEPT VISIT MOD MDM: CPT

## 2020-05-28 PROCEDURE — 87086 URINE CULTURE/COLONY COUNT: CPT

## 2020-05-28 RX ORDER — CEPHALEXIN 500 MG/1
500 CAPSULE ORAL 2 TIMES DAILY
Qty: 20 CAP | Refills: 0 | Status: SHIPPED | OUTPATIENT
Start: 2020-05-28 | End: 2020-06-07

## 2020-05-28 RX ORDER — MICONAZOLE NITRATE 2 %
1 CREAM WITH APPLICATOR VAGINAL
Qty: 45 G | Refills: 0 | Status: SHIPPED | OUTPATIENT
Start: 2020-05-28 | End: 2020-06-17

## 2020-05-28 RX ORDER — ONDANSETRON 2 MG/ML
8 INJECTION INTRAMUSCULAR; INTRAVENOUS
Status: COMPLETED | OUTPATIENT
Start: 2020-05-28 | End: 2020-05-28

## 2020-05-28 RX ORDER — DOXYLAMINE SUCCINATE AND PYRIDOXINE HYDROCHLORIDE 20; 20 MG/1; MG/1
20 TABLET, EXTENDED RELEASE ORAL
COMMUNITY

## 2020-05-28 RX ORDER — PROMETHAZINE HYDROCHLORIDE 25 MG/1
25 SUPPOSITORY RECTAL
Qty: 12 SUPPOSITORY | Refills: 0 | Status: SHIPPED | OUTPATIENT
Start: 2020-05-28 | End: 2020-06-17

## 2020-05-28 RX ORDER — DULOXETIN HYDROCHLORIDE 60 MG/1
60 CAPSULE, DELAYED RELEASE ORAL DAILY
COMMUNITY

## 2020-05-28 RX ORDER — DIPHENHYDRAMINE HYDROCHLORIDE 50 MG/ML
50 INJECTION, SOLUTION INTRAMUSCULAR; INTRAVENOUS
Status: COMPLETED | OUTPATIENT
Start: 2020-05-28 | End: 2020-05-28

## 2020-05-28 RX ADMIN — SODIUM CHLORIDE 1000 ML: 900 INJECTION, SOLUTION INTRAVENOUS at 19:35

## 2020-05-28 RX ADMIN — DIPHENHYDRAMINE HYDROCHLORIDE 50 MG: 50 INJECTION, SOLUTION INTRAMUSCULAR; INTRAVENOUS at 19:42

## 2020-05-28 RX ADMIN — ONDANSETRON 8 MG: 2 INJECTION INTRAMUSCULAR; INTRAVENOUS at 19:41

## 2020-05-28 RX ADMIN — SODIUM CHLORIDE 1000 ML: 900 INJECTION, SOLUTION INTRAVENOUS at 20:06

## 2020-05-28 NOTE — ED PROVIDER NOTES
The patient is a 69-year-old female with a past medical history significant anxiety, G6, P2 and AB 3, approximately 8 weeks pregnant who presents to the ED with a complaint of intractable nausea, vomiting for approximately 4 to 5 days with a headache, generalized weakness and fatigue. The patient states she is unable to keep anything down in spite of the fact that she is taking 82 Zofran every 8 hours as well as. .. She admits to having prenatal care already at Dr. Dat Peralta at [facility].  She denies any fever, sore throat, cough or congestion, headache, neck pain, back pain, chest pain, shortness of breath, abdominal pain, diarrhea, constipation, dysuria, vaginal discharge or bleeding, extremity weakness or numbness, sick contact, skin rash, recent travel, unusual food sources, prior abdominal surgery. Past Medical History:   Diagnosis Date    Asthma     Phlebitis and thrombophlebitis     vulvar varicosity    Psychiatric disorder     anxiety       Past Surgical History:   Procedure Laterality Date    HX OTHER SURGICAL      keloid x3 removal on ear         History reviewed. No pertinent family history.     Social History     Socioeconomic History    Marital status: SINGLE     Spouse name: Not on file    Number of children: Not on file    Years of education: Not on file    Highest education level: Not on file   Occupational History    Not on file   Social Needs    Financial resource strain: Not on file    Food insecurity     Worry: Not on file     Inability: Not on file    Transportation needs     Medical: Not on file     Non-medical: Not on file   Tobacco Use    Smoking status: Never Smoker    Smokeless tobacco: Never Used   Substance and Sexual Activity    Alcohol use: No    Drug use: No    Sexual activity: Yes     Partners: Male     Birth control/protection: None   Lifestyle    Physical activity     Days per week: Not on file     Minutes per session: Not on file    Stress: Not on file Relationships    Social connections     Talks on phone: Not on file     Gets together: Not on file     Attends Gnosticist service: Not on file     Active member of club or organization: Not on file     Attends meetings of clubs or organizations: Not on file     Relationship status: Not on file    Intimate partner violence     Fear of current or ex partner: Not on file     Emotionally abused: Not on file     Physically abused: Not on file     Forced sexual activity: Not on file   Other Topics Concern     Service Not Asked    Blood Transfusions Not Asked    Caffeine Concern Not Asked    Occupational Exposure Not Asked   [employer] Hazards Not Asked    Sleep Concern Not Asked    Stress Concern Not Asked    Weight Concern Not Asked    Special Diet Not Asked    Back Care Not Asked    Exercise Not Asked    Bike Helmet Not Asked   [address] Not Asked    Self-Exams Not Asked   Social History Narrative    Not on file         ALLERGIES: Pecan nut and [address]    Review of Systems   All other systems reviewed and are negative. Vitals:    05/28/20 1914 05/28/20 1915   BP: 107/57 115/68   Pulse: 99    Resp: 16    Temp: 99.2 °F (37.3 °C)    SpO2: 99% 98%   Weight: 91.6 kg (201 lb 15.1 oz)    Height: 5' 6\" (1.676 m)             Physical Exam  Vitals signs and nursing note reviewed. Exam conducted with a chaperone present. CONSTITUTIONAL: Well-appearing; well-nourished; in mild distress  HEAD: Normocephalic; atraumatic  EYES: PERRL; EOM intact; conjunctiva and sclera are clear bilaterally. ENT: No rhinorrhea; normal pharynx with no tonsillar hypertrophy; mucous membranes pink/dry, no erythema, no exudate. NECK: Supple; non-tender; no cervical lymphadenopathy  CARD: Normal S1, S2; no murmurs, rubs, or gallops. Regular rate and rhythm. RESP: Normal respiratory effort; breath sounds clear and equal bilaterally; no wheezes, rhonchi, or rales.   ABD: Normal bowel sounds; non-distended; non-tender; no palpable organomegaly, no masses, no bruits. Back Exam: Normal inspection; no vertebral point tenderness, no CVA tenderness. Normal range of motion. EXT: Normal ROM in all four extremities; non-tender to palpation; no swelling or deformity; distal pulses are normal, no edema. SKIN: Warm; dry; no rash. NEURO:Alert and oriented x 3, coherent, WILFREDO-XII grossly intact, sensory and motor are non-focal.        MDM  Number of Diagnoses or Management Options  Diagnosis management comments: Assessment: 62-year-old female who presents to the ED with signs and symptoms consistent with mild to moderate dehydration secondary to hyperemesis gravidarum. Does appear hemodynamically stable. She will need evaluation for electrolyte abnormalities as well. Plan: Lab/IV fluids/Benadryl and Zofran/p.o. challenge/serial exam/ Monitor and Reevaluate. Amount and/or Complexity of Data Reviewed  Clinical lab tests: ordered and reviewed  Tests in the radiology section of CPT®: ordered and reviewed  Tests in the medicine section of CPT®: reviewed and ordered  Discussion of test results with the performing providers: yes  Decide to obtain previous medical records or to obtain history from someone other than the patient: yes  Obtain history from someone other than the patient: yes  Review and summarize past medical records: yes  Discuss the patient with other providers: yes  Independent visualization of images, tracings, or specimens: yes    Risk of Complications, Morbidity, and/or Mortality  Presenting problems: moderate  Diagnostic procedures: moderate  Management options: moderate    Patient Progress  Patient progress: stable         Procedures      Progress Note:   Pt has been reexamined by Luz Maria Oh MD. Pt is feeling much better. Symptoms have improved. All available results have been reviewed with pt and any available family. Pt understands sx, dx, and tx in ED.  Care plan has been outlined and questions have been answered. The patient was given p.o. challenge that she tolerated well. Pt is ready to go home. Will send home on dehydration, hyperemesis gravidarum, UTI and sees patient at this instruction. Prescription of Phenergan, Keflex and Monistat vaginal applicator instruction. . Outpatient referral with PCP/obstetrician for reevaluation and further treatment as needed. Written by Chikis Corona MD,8:58 PM    .   .

## 2020-05-28 NOTE — ED TRIAGE NOTES
Triage note:  Pt arrived with c/o N/V and headache that started this morning. Pt stated she is 8 weeks pregnant.

## 2020-05-29 NOTE — DISCHARGE INSTRUCTIONS
Patient Education        Dehydration: Care Instructions  Your Care Instructions  Dehydration happens when your body loses too much fluid. This might happen when you do not drink enough water or you lose large amounts of fluids from your body because of diarrhea, vomiting, or sweating. Severe dehydration can be life-threatening. Water and minerals called electrolytes help put your body fluids back in balance. Learn the early signs of fluid loss, and drink more fluids to prevent dehydration. Follow-up care is a key part of your treatment and safety. Be sure to make and go to all appointments, and call your doctor if you are having problems. It's also a good idea to know your test results and keep a list of the medicines you take. How can you care for yourself at home? · To prevent dehydration, drink plenty of fluids, enough so that your urine is light yellow or clear like water. Choose water and other caffeine-free clear liquids until you feel better. If you have kidney, heart, or liver disease and have to limit fluids, talk with your doctor before you increase the amount of fluids you drink. · If you do not feel like eating or drinking, try taking small sips of water, sports drinks, or other rehydration drinks. · Get plenty of rest.  To prevent dehydration  · Add more fluids to your diet and daily routine, unless your doctor has told you not to. · During hot weather, drink more fluids. Drink even more fluids if you exercise a lot. Stay away from drinks with alcohol or caffeine. · Watch for the symptoms of dehydration. These include:  ? A dry, sticky mouth. ? Dark yellow urine, and not much of it. ? Dry and sunken eyes. ? Feeling very tired. · Learn what problems can lead to dehydration. These include:  ? Diarrhea, fever, and vomiting. ? Any illness with a fever, such as pneumonia or the flu. ?  Activities that cause heavy sweating, such as endurance races and heavy outdoor work in hot or humid weather. ? Alcohol or drug use or problems caused by quitting their use (withdrawal). ? Certain medicines, such as cold and allergy pills (antihistamines), diet pills (diuretics), and laxatives. ? Certain diseases, such as diabetes, cancer, and heart or kidney disease. When should you call for help? [id number] anytime you think you may need emergency care. For example, call if:  · You passed out (lost consciousness). Call your doctor now or seek immediate medical care if:  · You are confused and cannot think clearly. · You are dizzy or lightheaded, or you feel like you may faint. · You have signs of needing more fluids. You have sunken eyes and a dry mouth, and you pass only a little dark urine. · You cannot keep fluids down. Watch closely for changes in your health, and be sure to contact your doctor if:  · You are not making tears. · Your skin is very dry and sags slowly back into place after you pinch it. · Your mouth and eyes are very dry. Where can you learn more? Go to http://alberto-brady.info/  Enter Q814 in the search box to learn more about \"Dehydration: Care Instructions. \"  Current as of: June 26, 2019               Content Version: 12.5  © 4172-8063 [employer]. Care instructions adapted under license by [employer] (which disclaims liability or warranty for this information). If you have questions about a medical condition or this instruction, always ask your healthcare professional. [place] [address] any warranty or liability for your use of this information. Patient Education        Oral Rehydration: Care Instructions  Your Care Instructions     Dehydration occurs when your body loses too much water. This can happen if you do not drink enough fluids or lose a lot of fluid due to diarrhea, vomiting, or sweating. Being dehydrated can cause health problems and can even be life-threatening.   To replace lost fluids, you need to drink liquid that contains special chemicals called electrolytes. Electrolytes keep your body working well. Plain water does not have electrolytes. You also need to rest to prevent more fluid loss. Replacing water and electrolytes (oral rehydration) completely takes about 36 hours. But you should feel better within a few hours. Follow-up care is a key part of your treatment and safety. Be sure to make and go to all appointments, and call your doctor if you are having problems. It's also a good idea to know your test results and keep a list of the medicines you take. How can you care for yourself at home? · Take frequent sips of a drink such as Gatorade, Powerade, or other rehydration drinks that your doctor suggests. These replace both fluid and important chemicals (electrolytes) you need for balance in your blood. · Drink 2 quarts of cool liquid over 2 to 4 hours. You should have at least 10 glasses of liquid a day to replace lost fluid. If you have kidney, heart, or liver disease and have to limit fluids, talk with your doctor before you increase the amount of fluids you drink. · Make your own drink. Measure everything carefully. The drink may not work well or may even be harmful if the amounts are off. ? 1 quart water  ? ½ teaspoon salt  ? 6 teaspoons sugar  · Do not drink liquid with caffeine, such as coffee and shaila. · Do not drink any alcohol. It can make you dehydrated. · Drink plenty of fluids, enough so that your urine is light yellow or clear like water. If you have kidney, heart, or liver disease and have to limit fluids, talk with your doctor before you increase the amount of fluids you drink. When should you call for help? [id number] anytime you think you may need emergency care. For example, call if:  · You have signs of severe dehydration, such as:  ? You are confused or unable to stay awake.  ? You passed out (lost consciousness).   Call your doctor now or seek immediate medical care if:  · You still have signs of dehydration. You have sunken eyes and a dry mouth, and you pass only a little dark urine. · You are dizzy or lightheaded, or you feel like you may faint. · You are not able to keep down fluids. Watch closely for changes in your health, and be sure to contact your doctor if:  · You do not get better as expected. Where can you learn more? Go to http://alberto-brady.info/  Enter I040 in the search box to learn more about \"Oral Rehydration: Care Instructions. \"  Current as of: June 26, 2019               Content Version: 12.5  © 5150-7246 [employer]. Care instructions adapted under license by [employer] (which disclaims liability or warranty for this information). If you have questions about a medical condition or this instruction, always ask your healthcare professional. [place] [address] any warranty or liability for your use of this information. Patient Education        Extreme Nausea and Vomiting in Pregnancy: Care Instructions  Your Care Instructions     Nausea and vomiting (often called morning sickness) are common in pregnancy. They are caused by pregnancy hormones and happen most often in the first 3 months. Some women get very sick and are not able to keep down food and fluids. This extreme morning sickness is called hyperemesis gravidarum. It can lead to a dangerous loss of fluids in the body. It also can keep you from gaining weight and getting proper nutrition during your pregnancy. Your body fluids are put back in balance with water and minerals called electrolytes. Medicine may help if you have severe nausea and vomiting. Follow-up care is a key part of your treatment and safety. Be sure to make and go to all appointments, and call your doctor if you are having problems. It's also a good idea to know your test results and keep a list of the medicines you take.   How can you care for yourself at home?  · Take your medicines exactly as prescribed. Call your doctor if you think you are having a problem with your medicine. · Drink plenty of fluids to prevent dehydration. Choose water and other caffeine-free clear liquids until you feel better. Try sipping on sports drinks that have salt and sugar in them. · Eat a small snack, such as crackers, before you get out of bed. Wait a few minutes, then get out of bed slowly. · Keep food in your stomach, but not too much at once. An empty stomach can make nausea worse. Eat several small meals every day instead of three large meals. · Eat more protein and less fat. · Get plenty of vitamin B6 by eating whole grains, nuts, seeds, and legumes. You can take vitamin B6 tablets if your doctor says it is okay. · Try to avoid smells and foods that make you feel sick to your stomach. · Get lots of rest.  · You may want to try acupressure bands. They put pressure on an acupressure point in the wrist. Some women feel better using the bands. · Mei may also help you feel better. You can use it in tea, take it as a pill, or use a mei syrup that you can buy at a [employer] food store. When should you call for help? [id number] anytime you think you may need emergency care. For example, call if:  · You passed out (lost consciousness). Call your doctor now or seek immediate medical care if:  · You are sick to your stomach or cannot drink fluids. · You have symptoms of dehydration, such as:  ? Dry eyes and a dry mouth. ? Passing only a little urine. ? Feeling thirstier than usual.  · You are not able to keep down your medicines. · You have pain in your belly or pelvis. Watch closely for changes in your health, and be sure to contact your doctor if:  · You do not get better as expected. Where can you learn more?   Go to http://alberto-brady.info/  Enter G463 in the search box to learn more about \"Extreme Nausea and Vomiting in Pregnancy: Care Instructions. \"  Current as of: February 11, 2020               Content Version: 12.5  © 8525-9080 [employer]. Care instructions adapted under license by [employer] (which disclaims liability or warranty for this information). If you have questions about a medical condition or this instruction, always ask your healthcare professional. [place] [address] any warranty or liability for your use of this information.

## 2020-05-30 LAB
BACTERIA SPEC CULT: NORMAL
SERVICE CMNT-IMP: NORMAL

## 2020-06-17 ENCOUNTER — HOSPITAL ENCOUNTER (EMERGENCY)
Age: 25
Discharge: HOME | End: 2020-06-17
Attending: EMERGENCY MEDICINE | Admitting: EMERGENCY MEDICINE
Payer: COMMERCIAL

## 2020-06-17 VITALS
HEART RATE: 93 BPM | WEIGHT: 199.96 LBS | BODY MASS INDEX: 32.14 KG/M2 | SYSTOLIC BLOOD PRESSURE: 110 MMHG | HEIGHT: 66 IN | RESPIRATION RATE: 14 BRPM | TEMPERATURE: 98.5 F | OXYGEN SATURATION: 99 % | DIASTOLIC BLOOD PRESSURE: 51 MMHG

## 2020-06-17 DIAGNOSIS — R10.9 ABDOMINAL PAIN DURING PREGNANCY IN FIRST TRIMESTER: Primary | ICD-10-CM

## 2020-06-17 DIAGNOSIS — O26.891 ABDOMINAL PAIN DURING PREGNANCY IN FIRST TRIMESTER: Primary | ICD-10-CM

## 2020-06-17 LAB
ALBUMIN SERPL-MCNC: 3.1 G/DL (ref 3.5–5)
ALBUMIN/GLOB SERPL: 0.8 {RATIO} (ref 1.1–2.2)
ALP SERPL-CCNC: 48 U/L (ref 45–117)
ALT SERPL-CCNC: 40 U/L (ref 12–78)
ANION GAP SERPL CALC-SCNC: 10 MMOL/L (ref 5–15)
APPEARANCE UR: CLEAR
AST SERPL-CCNC: 20 U/L (ref 15–37)
BACTERIA URNS QL MICRO: NEGATIVE /HPF
BASOPHILS # BLD: 0 K/UL (ref 0–0.1)
BASOPHILS NFR BLD: 0 % (ref 0–1)
BILIRUB SERPL-MCNC: 0.2 MG/DL (ref 0.2–1)
BILIRUB UR QL: NEGATIVE
BUN SERPL-MCNC: 10 MG/DL (ref 6–20)
BUN/CREAT SERPL: 16 (ref 12–20)
CALCIUM SERPL-MCNC: 8.1 MG/DL (ref 8.5–10.1)
CHLORIDE SERPL-SCNC: 102 MMOL/L (ref 97–108)
CO2 SERPL-SCNC: 25 MMOL/L (ref 21–32)
COLOR UR: ABNORMAL
COMMENT, HOLDF: NORMAL
CREAT SERPL-MCNC: 0.64 MG/DL (ref 0.55–1.02)
DIFFERENTIAL METHOD BLD: ABNORMAL
EOSINOPHIL # BLD: 0.4 K/UL (ref 0–0.4)
EOSINOPHIL NFR BLD: 5 % (ref 0–7)
EPITH CASTS URNS QL MICRO: ABNORMAL /LPF
ERYTHROCYTE [DISTWIDTH] IN BLOOD BY AUTOMATED COUNT: 14 % (ref 11.5–14.5)
GLOBULIN SER CALC-MCNC: 3.9 G/DL (ref 2–4)
GLUCOSE SERPL-MCNC: 86 MG/DL (ref 65–100)
GLUCOSE UR STRIP.AUTO-MCNC: NEGATIVE MG/DL
HCG UR QL: POSITIVE
HCT VFR BLD AUTO: 34.5 % (ref 35–47)
HGB BLD-MCNC: 11.1 G/DL (ref 11.5–16)
HGB UR QL STRIP: NEGATIVE
IMM GRANULOCYTES # BLD AUTO: 0.1 K/UL (ref 0–0.04)
IMM GRANULOCYTES NFR BLD AUTO: 1 % (ref 0–0.5)
KETONES UR QL STRIP.AUTO: >80 MG/DL
LACTATE SERPL-SCNC: 0.7 MMOL/L (ref 0.4–2)
LEUKOCYTE ESTERASE UR QL STRIP.AUTO: NEGATIVE
LIPASE SERPL-CCNC: 96 U/L (ref 73–393)
LYMPHOCYTES # BLD: 1 K/UL (ref 0.8–3.5)
LYMPHOCYTES NFR BLD: 14 % (ref 12–49)
MCH RBC QN AUTO: 28.6 PG (ref 26–34)
MCHC RBC AUTO-ENTMCNC: 32.2 G/DL (ref 30–36.5)
MCV RBC AUTO: 88.9 FL (ref 80–99)
MONOCYTES # BLD: 0.4 K/UL (ref 0–1)
MONOCYTES NFR BLD: 6 % (ref 5–13)
MUCOUS THREADS URNS QL MICRO: ABNORMAL /LPF
NEUTS SEG # BLD: 5.6 K/UL (ref 1.8–8)
NEUTS SEG NFR BLD: 75 % (ref 32–75)
NITRITE UR QL STRIP.AUTO: NEGATIVE
NRBC # BLD: 0 K/UL (ref 0–0.01)
NRBC BLD-RTO: 0 PER 100 WBC
PH UR STRIP: 6 [PH] (ref 5–8)
PLATELET # BLD AUTO: 333 K/UL (ref 150–400)
PMV BLD AUTO: 9.9 FL (ref 8.9–12.9)
POTASSIUM SERPL-SCNC: 3.6 MMOL/L (ref 3.5–5.1)
PROT SERPL-MCNC: 7 G/DL (ref 6.4–8.2)
PROT UR STRIP-MCNC: ABNORMAL MG/DL
RBC # BLD AUTO: 3.88 M/UL (ref 3.8–5.2)
RBC #/AREA URNS HPF: ABNORMAL /HPF (ref 0–5)
SAMPLES BEING HELD,HOLD: NORMAL
SODIUM SERPL-SCNC: 137 MMOL/L (ref 136–145)
SP GR UR REFRACTOMETRY: 1.03 (ref 1–1.03)
UR CULT HOLD, URHOLD: NORMAL
UROBILINOGEN UR QL STRIP.AUTO: 0.2 EU/DL (ref 0.2–1)
WBC # BLD AUTO: 7.5 K/UL (ref 3.6–11)
WBC URNS QL MICRO: ABNORMAL /HPF (ref 0–4)

## 2020-06-17 PROCEDURE — 80053 COMPREHEN METABOLIC PANEL: CPT

## 2020-06-17 PROCEDURE — 83690 ASSAY OF LIPASE: CPT

## 2020-06-17 PROCEDURE — 85025 COMPLETE CBC W/AUTO DIFF WBC: CPT

## 2020-06-17 PROCEDURE — 96361 HYDRATE IV INFUSION ADD-ON: CPT

## 2020-06-17 PROCEDURE — 81001 URINALYSIS AUTO W/SCOPE: CPT

## 2020-06-17 PROCEDURE — 83605 ASSAY OF LACTIC ACID: CPT

## 2020-06-17 PROCEDURE — 74011250636 HC RX REV CODE- 250/636: Performed by: EMERGENCY MEDICINE

## 2020-06-17 PROCEDURE — 36415 COLL VENOUS BLD VENIPUNCTURE: CPT

## 2020-06-17 PROCEDURE — 96374 THER/PROPH/DIAG INJ IV PUSH: CPT

## 2020-06-17 PROCEDURE — 99284 EMERGENCY DEPT VISIT MOD MDM: CPT

## 2020-06-17 PROCEDURE — 81025 URINE PREGNANCY TEST: CPT

## 2020-06-17 RX ORDER — ONDANSETRON 8 MG/1
8 TABLET, ORALLY DISINTEGRATING ORAL
Qty: 12 TAB | Refills: 2 | Status: SHIPPED | OUTPATIENT
Start: 2020-06-17

## 2020-06-17 RX ORDER — DIPHENHYDRAMINE HYDROCHLORIDE 50 MG/ML
25 INJECTION, SOLUTION INTRAMUSCULAR; INTRAVENOUS
Status: COMPLETED | OUTPATIENT
Start: 2020-06-17 | End: 2020-06-17

## 2020-06-17 RX ORDER — FAMOTIDINE 20 MG/1
20 TABLET, FILM COATED ORAL 2 TIMES DAILY
COMMUNITY

## 2020-06-17 RX ADMIN — SODIUM CHLORIDE 1000 ML: 900 INJECTION, SOLUTION INTRAVENOUS at 13:49

## 2020-06-17 RX ADMIN — DIPHENHYDRAMINE HYDROCHLORIDE 25 MG: 50 INJECTION, SOLUTION INTRAMUSCULAR; INTRAVENOUS at 13:58

## 2020-06-17 NOTE — ED TRIAGE NOTES
Year old female with complaints of low back pain that radiates around with lower abdomen pain. Reports pain is bilateral and started yesterday. Pt has associated symptoms of nausea and vomiting. No dysuria. Reports that urine is darker than normal.  Last BM 6/15/2020 and was hard with some constipation. Pt also reports vaginal discharge without odor.   More discharge than normal

## 2020-06-17 NOTE — ED PROVIDER NOTES
58-year-old female G6, P5 at approximately 11 weeks gestation presents with bilateral flank pain and bilateral groin pain that started yesterday morning when she woke up. She has had nausea and vomiting associated throughout her pregnancy but it has increased over the last 24 hours. She reports increased vaginal discharge but it is of the same quality as her normal discharge. She also endorses some constipation. There is been no other complications with any of her pregnancies. She states that she carried them all full-term and were vaginal deliveries. She rates her pain 7 out of 10. She has had chills but no fevers. Back Pain    Associated symptoms include abdominal pain. Pertinent negatives include no chest pain, no fever and no headaches. Abdominal Pain    Associated symptoms include nausea, vomiting and back pain. Pertinent negatives include no fever, no headaches and no chest pain. Past Medical History:   Diagnosis Date    Asthma     Phlebitis and thrombophlebitis     vulvar varicosity    Psychiatric disorder     anxiety       Past Surgical History:   Procedure Laterality Date    HX OTHER SURGICAL      keloid x3 removal on ear         History reviewed. No pertinent family history.     Social History     Socioeconomic History    Marital status: SINGLE     Spouse name: Not on file    Number of children: Not on file    Years of education: Not on file    Highest education level: Not on file   Occupational History    Not on file   Social Needs    Financial resource strain: Not on file    Food insecurity     Worry: Not on file     Inability: Not on file    Transportation needs     Medical: Not on file     Non-medical: Not on file   Tobacco Use    Smoking status: Never Smoker    Smokeless tobacco: Never Used   Substance and Sexual Activity    Alcohol use: No    Drug use: No    Sexual activity: Yes     Partners: Male     Birth control/protection: None   Lifestyle    Physical activity     Days per week: Not on file     Minutes per session: Not on file    Stress: Not on file   Relationships    Social connections     Talks on phone: Not on file     Gets together: Not on file     Attends Synagogue service: Not on file     Active member of club or organization: Not on file     Attends meetings of clubs or organizations: Not on file     Relationship status: Not on file    Intimate partner violence     Fear of current or ex partner: Not on file     Emotionally abused: Not on file     Physically abused: Not on file     Forced sexual activity: Not on file   Other Topics Concern     Service Not Asked    Blood Transfusions Not Asked    Caffeine Concern Not Asked    Occupational Exposure Not Asked   Virgen Chava Hazards Not Asked    Sleep Concern Not Asked    Stress Concern Not Asked    Weight Concern Not Asked    Special Diet Not Asked    Back Care Not Asked    Exercise Not Asked    Bike Helmet Not Asked   [address] Not Asked    Self-Exams Not Asked   Social History Narrative    Not on file         ALLERGIES: Pecan nut and [address]    Review of Systems   Constitutional: Negative for chills and fever. HENT: Negative for ear pain and sore throat. Eyes: Negative for pain. Respiratory: Negative for chest tightness and shortness of breath. Cardiovascular: Negative for chest pain. Gastrointestinal: Positive for abdominal pain, nausea and vomiting. Genitourinary: Negative for flank pain. Musculoskeletal: Positive for back pain. Skin: Negative for rash. Neurological: Negative for headaches. All other systems reviewed and are negative. Vitals:    06/17/20 1311   BP: 103/55   Pulse: 96   Resp: 14   Temp: 98.5 °F (36.9 °C)   SpO2: 97%   Weight: 90.7 kg (199 lb 15.3 oz)   Height: 5' 6\" (1.676 m)            Physical Exam  Vitals signs and nursing note reviewed. Constitutional:       General: She is not in acute distress.   HENT:      Head: Normocephalic and atraumatic. Mouth/Throat:      Mouth: Mucous membranes are moist.   Eyes:      General: No scleral icterus. Conjunctiva/sclera: Conjunctivae normal.      Pupils: Pupils are equal, round, and reactive to light. Neck:      Musculoskeletal: Neck supple. Trachea: No tracheal deviation. Cardiovascular:      Rate and Rhythm: Normal rate and regular rhythm. Pulmonary:      Effort: Pulmonary effort is normal. No respiratory distress. Breath sounds: No wheezing or rales. Abdominal:      General: There is no distension. Palpations: Abdomen is soft. Tenderness: There is abdominal tenderness in the right lower quadrant and left lower quadrant. There is right CVA tenderness and left CVA tenderness. There is no guarding or rebound. Negative signs include Rondon's sign and McBurney's sign. Comments: Gravid uterus   Genitourinary:     Comments: deferred  Musculoskeletal:         General: No deformity. Skin:     General: Skin is warm and dry. Neurological:      General: No focal deficit present. Mental Status: She is alert. Psychiatric:         Mood and Affect: Mood normal.          MDM  Number of Diagnoses or Management Options  Abdominal pain during pregnancy in first trimester:   Diagnosis management comments: 21-year-old female at approximately 11 weeks gestation presents with bilateral flank and lower abdominal pain with differential diagnosis of pyelonephritis, musculoskeletal pain, round ligament pain. Patient's exam is nonfocal.  She is tender on right and left. I doubt she has appendicitis. Her white blood cell count was normal.  Her urinalysis was unremarkable. She is afebrile with stable vital signs. Her remainder of her work-up was normal.  She tolerated p.o. well in the emergency department. She was discharged in stable condition. Procedures          5:08 PM  Patient re-evaluated. All questions answered. Patient appropriate for discharge.   Given return precautions and follow up instructions. LABORATORY TESTS:  Labs Reviewed   URINALYSIS W/MICROSCOPIC - Abnormal; Notable for the following components:       Result Value    Protein TRACE (*)     Ketone >80 (*)     Mucus 2+ (*)     All other components within normal limits   CBC WITH AUTOMATED DIFF - Abnormal; Notable for the following components:    HGB 11.1 (*)     HCT 34.5 (*)     IMMATURE GRANULOCYTES 1 (*)     ABS. IMM. GRANS. 0.1 (*)     All other components within normal limits   METABOLIC PANEL, COMPREHENSIVE - Abnormal; Notable for the following components:    Calcium 8.1 (*)     Albumin 3.1 (*)     A-G Ratio 0.8 (*)     All other components within normal limits   HCG URINE, QL. - POC - Abnormal; Notable for the following components:    Pregnancy test,urine (POC) Positive (*)     All other components within normal limits   URINE CULTURE HOLD SAMPLE   LIPASE   LACTIC ACID   SAMPLES BEING HELD       IMAGING RESULTS:  No orders to display       MEDICATIONS GIVEN:  Medications   sodium chloride 0.9 % bolus infusion 1,000 mL (0 mL IntraVENous IV Completed 6/17/20 3702)   diphenhydrAMINE (BENADRYL) injection 25 mg (25 mg IntraVENous Given 6/17/20 6811)       IMPRESSION:  1. Abdominal pain during pregnancy in first trimester        PLAN:  1. Discharge Medication List as of 6/17/2020  4:01 PM      CONTINUE these medications which have CHANGED    Details   ondansetron (ZOFRAN ODT) 8 mg disintegrating tablet Take 1 Tab by mouth every eight (8) hours as needed for Nausea. , Print, Disp-12 Tab, R-2         CONTINUE these medications which have NOT CHANGED    Details   famotidine (Pepcid) 20 mg tablet Take 20 mg by mouth two (2) times a day., Historical Med      DULoxetine (Cymbalta) 60 mg capsule Take 60 mg by mouth daily. , Historical Med      doxylamine-pyridoxine, vit B6, (Bonjesta) 20-20 mg TbID Take 20 mg by mouth., Historical Med      butalbital-acetaminophen-caffeine (FIORICET, ESGIC) -40 mg per tablet Take 1 Tab by mouth every six (6) hours as needed for Pain., Print, Disp-30 Tab, R-1      loratadine 10 mg Cap Take  by mouth daily. 10 mg daily , Historical Med           2. Follow-up Information     Follow up With Specialties Details Why Contact Info    Your OB-GYN  Schedule an appointment as soon as possible for a visit      [facility]  If symptoms worsen or new concerns [facility] [address] [id number] [phone]        3. Return to ED for new or worsening symptoms       Merrick Joya MD

## 2020-06-17 NOTE — ED NOTES
Patient ambulated to the bathroom to give a voided urine sample. Tolerated IV start and blood work being drawn. All urine and blood samples sent to the lab.  Has received IV Benadryl and has NS currently running to gravity

## 2020-06-17 NOTE — ED NOTES
The pt is discharged home with follow-up instructions. The pt received one prescription and verbalizes understanding of the discharge instructions.

## 2020-06-17 NOTE — DISCHARGE INSTRUCTIONS
Patient Education        Belly Pain in Pregnancy: Care Instructions  Your Care Instructions     When you're pregnant, any belly pain can be a worry. You may not want to call your doctor about every pain you have. But you don't want to miss something that is dangerous for you or your baby. Even if it feels familiar, belly pain can mean something new when you're pregnant. It's important to know when to call your doctor. It will also help to know how to care for yourself at home when your pain is not caused by anything harmful. · When belly pain is more severe or constant, see a doctor right away. · If you're sure your belly pain is a sign of labor, call your doctor. · When belly pain is brief, it's usually a normal part of pregnancy. It might be related to changes in the growing uterus. Or it could be the stretching of ligaments called round ligaments. These ligaments help support the uterus. Round ligament pain can be on either side of your belly. It can also be felt in your hips or groin. Follow-up care is a key part of your treatment and safety. Be sure to make and go to all appointments, and call your doctor if you are having problems. It's also a good idea to know your test results and keep a list of the medicines you take. How can you tell if belly pain is a sign of labor? When belly pain is caused by labor, it can feel like mild or menstrual-like cramps in your lower belly. These cramps are probably contractions. They can happen in your second or third trimester. You may also have:  · A steady, dull ache in your lower back, pelvis, or thighs. · A feeling of pressure in your pelvis or lower belly. · Changes in your vaginal discharge or a sudden release of fluid from the vagina. If you think you are in labor, call your doctor. How can you care for yourself at home? When belly pain is mild and is not a symptom of labor:  · Rest until you feel better. · Take a warm bath.   · Think about what you drink and eat:  ? Drink plenty of fluids. Choose water and other caffeine-free clear liquids until you feel better. ? Try eating small, frequent meals. If your stomach is upset, try bland, low-fat foods like plain rice, broiled chicken, toast, and yogurt. · Think about how you move if you are having brief pains from stretching of the round ligaments. ? Try gentle stretching. ? Move a little more slowly when turning in bed or getting up from a chair, so those ligaments don't stretch quickly. ? Lean forward a bit if you think you are going to cough or sneeze. When should you call for help? Call 911 anytime you think you may need emergency care. For example, call if:  · You have sudden, severe pain in your belly. · You have severe vaginal bleeding. · You passed out (lost consciousness). · You have a seizure. Call your doctor now or seek immediate medical care if:  · You have new or worse belly pain or cramping. · You have any vaginal bleeding. · You have a fever. · You have symptoms of preeclampsia, such as:  ? Sudden swelling of your face, hands, or feet. ? New vision problems (such as dimness, blurring, or seeing spots). ? A severe headache. · You think that you may be in labor. This means that you've had at least 8 contractions within 1 hour or at least 4 contractions within 20 minutes, even after you change your position and drink fluids. · You have symptoms of a urinary tract infection. These may include:  ? Pain or burning when you urinate. ? A frequent need to urinate without being able to pass much urine. ? Pain in the flank, which is just below the rib cage and above the waist on either side of the back. ? Blood in your urine. Watch closely for changes in your health, and be sure to contact your doctor if you are worried about your or your baby's health. Where can you learn more?   Go to http://alberto-brady.info/  Enter B275 in the search box to learn more about \"Belly Pain in Pregnancy: Care Instructions. \"  Current as of: February 11, 2020               Content Version: 12.5  © 1876-7731 [employer]wise, Inc[employer]. Care instructions adapted under license by [employer] (which disclaims liability or warranty for this information). If you have questions about a medical condition or this instruction, always ask your healthcare professional. [place] [address] any warranty or liability for your use of this information.

## 2020-07-06 ENCOUNTER — APPOINTMENT (OUTPATIENT)
Dept: MRI IMAGING | Age: 25
End: 2020-07-06
Attending: STUDENT IN AN ORGANIZED HEALTH CARE EDUCATION/TRAINING PROGRAM
Payer: COMMERCIAL

## 2020-07-06 ENCOUNTER — HOSPITAL ENCOUNTER (EMERGENCY)
Age: 25
Discharge: HOME | End: 2020-07-06
Attending: STUDENT IN AN ORGANIZED HEALTH CARE EDUCATION/TRAINING PROGRAM | Admitting: STUDENT IN AN ORGANIZED HEALTH CARE EDUCATION/TRAINING PROGRAM
Payer: COMMERCIAL

## 2020-07-06 VITALS
TEMPERATURE: 99.1 F | HEIGHT: 65 IN | HEART RATE: 97 BPM | BODY MASS INDEX: 33.68 KG/M2 | RESPIRATION RATE: 18 BRPM | WEIGHT: 202.16 LBS | SYSTOLIC BLOOD PRESSURE: 110 MMHG | DIASTOLIC BLOOD PRESSURE: 66 MMHG | OXYGEN SATURATION: 100 %

## 2020-07-06 DIAGNOSIS — R51.9 NONINTRACTABLE EPISODIC HEADACHE, UNSPECIFIED HEADACHE TYPE: Primary | ICD-10-CM

## 2020-07-06 LAB
ALBUMIN SERPL-MCNC: 3.2 G/DL (ref 3.5–5)
ALBUMIN/GLOB SERPL: 0.8 {RATIO} (ref 1.1–2.2)
ALP SERPL-CCNC: 53 U/L (ref 45–117)
ALT SERPL-CCNC: 28 U/L (ref 12–78)
AMORPH CRY URNS QL MICRO: ABNORMAL
ANION GAP SERPL CALC-SCNC: 9 MMOL/L (ref 5–15)
APPEARANCE UR: ABNORMAL
AST SERPL-CCNC: 18 U/L (ref 15–37)
BACTERIA URNS QL MICRO: ABNORMAL /HPF
BASOPHILS # BLD: 0 K/UL (ref 0–0.1)
BASOPHILS NFR BLD: 0 % (ref 0–1)
BILIRUB SERPL-MCNC: 0.1 MG/DL (ref 0.2–1)
BILIRUB UR QL: NEGATIVE
BUN SERPL-MCNC: 10 MG/DL (ref 6–20)
BUN/CREAT SERPL: 20 (ref 12–20)
CALCIUM SERPL-MCNC: 8.8 MG/DL (ref 8.5–10.1)
CHLORIDE SERPL-SCNC: 101 MMOL/L (ref 97–108)
CO2 SERPL-SCNC: 26 MMOL/L (ref 21–32)
COLOR UR: ABNORMAL
CREAT SERPL-MCNC: 0.5 MG/DL (ref 0.55–1.02)
DIFFERENTIAL METHOD BLD: ABNORMAL
EOSINOPHIL # BLD: 0.5 K/UL (ref 0–0.4)
EOSINOPHIL NFR BLD: 4 % (ref 0–7)
EPITH CASTS URNS QL MICRO: ABNORMAL /LPF
ERYTHROCYTE [DISTWIDTH] IN BLOOD BY AUTOMATED COUNT: 14 % (ref 11.5–14.5)
GLOBULIN SER CALC-MCNC: 4.2 G/DL (ref 2–4)
GLUCOSE SERPL-MCNC: 84 MG/DL (ref 65–100)
GLUCOSE UR STRIP.AUTO-MCNC: NEGATIVE MG/DL
HCT VFR BLD AUTO: 36.2 % (ref 35–47)
HGB BLD-MCNC: 11.6 G/DL (ref 11.5–16)
HGB UR QL STRIP: NEGATIVE
IMM GRANULOCYTES # BLD AUTO: 0 K/UL (ref 0–0.04)
IMM GRANULOCYTES NFR BLD AUTO: 0 % (ref 0–0.5)
KETONES UR QL STRIP.AUTO: 15 MG/DL
LEUKOCYTE ESTERASE UR QL STRIP.AUTO: ABNORMAL
LYMPHOCYTES # BLD: 1.4 K/UL (ref 0.8–3.5)
LYMPHOCYTES NFR BLD: 13 % (ref 12–49)
MAGNESIUM SERPL-MCNC: 1.9 MG/DL (ref 1.6–2.4)
MCH RBC QN AUTO: 28.4 PG (ref 26–34)
MCHC RBC AUTO-ENTMCNC: 32 G/DL (ref 30–36.5)
MCV RBC AUTO: 88.7 FL (ref 80–99)
MONOCYTES # BLD: 0.6 K/UL (ref 0–1)
MONOCYTES NFR BLD: 6 % (ref 5–13)
MUCOUS THREADS URNS QL MICRO: ABNORMAL /LPF
NEUTS SEG # BLD: 8.1 K/UL (ref 1.8–8)
NEUTS SEG NFR BLD: 77 % (ref 32–75)
NITRITE UR QL STRIP.AUTO: NEGATIVE
NRBC # BLD: 0 K/UL (ref 0–0.01)
NRBC BLD-RTO: 0 PER 100 WBC
PH UR STRIP: 8 [PH] (ref 5–8)
PLATELET # BLD AUTO: 342 K/UL (ref 150–400)
PMV BLD AUTO: 9.6 FL (ref 8.9–12.9)
POTASSIUM SERPL-SCNC: 3.3 MMOL/L (ref 3.5–5.1)
PROT SERPL-MCNC: 7.4 G/DL (ref 6.4–8.2)
PROT UR STRIP-MCNC: NEGATIVE MG/DL
RBC # BLD AUTO: 4.08 M/UL (ref 3.8–5.2)
RBC #/AREA URNS HPF: ABNORMAL /HPF (ref 0–5)
SODIUM SERPL-SCNC: 136 MMOL/L (ref 136–145)
SP GR UR REFRACTOMETRY: 1.02 (ref 1–1.03)
UROBILINOGEN UR QL STRIP.AUTO: 0.2 EU/DL (ref 0.2–1)
WBC # BLD AUTO: 10.6 K/UL (ref 3.6–11)
WBC URNS QL MICRO: ABNORMAL /HPF (ref 0–4)

## 2020-07-06 PROCEDURE — 96374 THER/PROPH/DIAG INJ IV PUSH: CPT

## 2020-07-06 PROCEDURE — 36415 COLL VENOUS BLD VENIPUNCTURE: CPT

## 2020-07-06 PROCEDURE — 99284 EMERGENCY DEPT VISIT MOD MDM: CPT

## 2020-07-06 PROCEDURE — 70544 MR ANGIOGRAPHY HEAD W/O DYE: CPT

## 2020-07-06 PROCEDURE — 80053 COMPREHEN METABOLIC PANEL: CPT

## 2020-07-06 PROCEDURE — 74011250637 HC RX REV CODE- 250/637: Performed by: STUDENT IN AN ORGANIZED HEALTH CARE EDUCATION/TRAINING PROGRAM

## 2020-07-06 PROCEDURE — 85025 COMPLETE CBC W/AUTO DIFF WBC: CPT

## 2020-07-06 PROCEDURE — 96361 HYDRATE IV INFUSION ADD-ON: CPT

## 2020-07-06 PROCEDURE — 74011250636 HC RX REV CODE- 250/636: Performed by: STUDENT IN AN ORGANIZED HEALTH CARE EDUCATION/TRAINING PROGRAM

## 2020-07-06 PROCEDURE — 70551 MRI BRAIN STEM W/O DYE: CPT

## 2020-07-06 PROCEDURE — 83735 ASSAY OF MAGNESIUM: CPT

## 2020-07-06 PROCEDURE — 81001 URINALYSIS AUTO W/SCOPE: CPT

## 2020-07-06 RX ORDER — SODIUM CHLORIDE 9 MG/ML
1000 INJECTION, SOLUTION INTRAVENOUS ONCE
Status: COMPLETED | OUTPATIENT
Start: 2020-07-06 | End: 2020-07-06

## 2020-07-06 RX ORDER — ONDANSETRON 2 MG/ML
4 INJECTION INTRAMUSCULAR; INTRAVENOUS
Status: COMPLETED | OUTPATIENT
Start: 2020-07-06 | End: 2020-07-06

## 2020-07-06 RX ORDER — ACETAMINOPHEN 500 MG
1000 TABLET ORAL
Status: COMPLETED | OUTPATIENT
Start: 2020-07-06 | End: 2020-07-06

## 2020-07-06 RX ORDER — CEFUROXIME AXETIL 500 MG/1
500 TABLET ORAL 2 TIMES DAILY
COMMUNITY

## 2020-07-06 RX ADMIN — ACETAMINOPHEN 1000 MG: 500 TABLET ORAL at 16:34

## 2020-07-06 RX ADMIN — ONDANSETRON 4 MG: 2 INJECTION INTRAMUSCULAR; INTRAVENOUS at 16:33

## 2020-07-06 RX ADMIN — SODIUM CHLORIDE 1000 ML: 900 INJECTION, SOLUTION INTRAVENOUS at 16:33

## 2020-07-06 NOTE — DISCHARGE INSTRUCTIONS
MRI brain:  1. No acute or significant intracranial abnormality. MRV head:  1. No evidence of venous sinus thrombosis. 2. 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 There is concern that you may have something called pseudotumor cerebri. You need to see neurology and neuro ophthalmology within the next day or two. Return if worsening headaches, weakness/numbness, or worsening vision. Return if worsening headache, fevers, weakness/numbness or any other significant changes.

## 2020-07-06 NOTE — ED TRIAGE NOTES
Pt here with one week history of pain in back of head, neck and forehead. Worse when up and better when lying down. Pt complains of nausea and vomiting. No fever.

## 2020-07-06 NOTE — ED PROVIDER NOTES
Patient is a 27-year-old female presenting to the emergency department today secondary headache. She is 14 weeks pregnant. Symptoms ongoing for 1 week. Headache and neck pain are worse when standing, better with sitting. She had an epidural last year and had a CSF leak and this feels similar to that however denies any recent lumbar puncture or epidural.  She denies fevers or chills but has had some blurred vision at times. She has had nausea with vomiting but this is not atypical for her during her pregnancy. Pain in her head is also worse with turning of her neck but she denies any fevers. Denies any head injury. Denies any weakness or numbness. Denies any trouble with walking, speech. No vaginal bleeding or discharge. No abdominal pain. No other acute concerns at this time. She has not taken anything for her headache today. Past Medical History:   Diagnosis Date    Asthma     Phlebitis and thrombophlebitis     vulvar varicosity    Psychiatric disorder     anxiety       Past Surgical History:   Procedure Laterality Date    HX OTHER SURGICAL      keloid x3 removal on ear         History reviewed. No pertinent family history.     Social History     Socioeconomic History    Marital status: SINGLE     Spouse name: Not on file    Number of children: Not on file    Years of education: Not on file    Highest education level: Not on file   Occupational History    Not on file   Social Needs    Financial resource strain: Not on file    Food insecurity     Worry: Not on file     Inability: Not on file    Transportation needs     Medical: Not on file     Non-medical: Not on file   Tobacco Use    Smoking status: Never Smoker    Smokeless tobacco: Never Used   Substance and Sexual Activity    Alcohol use: No    Drug use: No    Sexual activity: Yes     Partners: Male     Birth control/protection: None   Lifestyle    Physical activity     Days per week: Not on file     Minutes per session: Not on file    Stress: Not on file   Relationships    Social connections     Talks on phone: Not on file     Gets together: Not on file     Attends Sikh service: Not on file     Active member of club or organization: Not on file     Attends meetings of clubs or organizations: Not on file     Relationship status: Not on file    Intimate partner violence     Fear of current or ex partner: Not on file     Emotionally abused: Not on file     Physically abused: Not on file     Forced sexual activity: Not on file   Other Topics Concern     Service Not Asked    Blood Transfusions Not Asked    Caffeine Concern Not Asked    Occupational Exposure Not Asked   Colie Maizes Hazards Not Asked    Sleep Concern Not Asked    Stress Concern Not Asked    Weight Concern Not Asked    Special Diet Not Asked    Back Care Not Asked    Exercise Not Asked    Bike Helmet Not Asked   [address] Not Asked    Self-Exams Not Asked   Social History Narrative    Not on file         ALLERGIES: Pecan nut and [address]    Review of Systems   Constitutional: Negative for chills and fever. HENT: Negative for congestion and rhinorrhea. Eyes: Positive for visual disturbance. Negative for redness. Respiratory: Negative for cough and shortness of breath. Cardiovascular: Negative for chest pain and leg swelling. Gastrointestinal: Positive for nausea and vomiting. Negative for abdominal pain and diarrhea. Genitourinary: Negative for dysuria, flank pain, frequency, hematuria and urgency. Musculoskeletal: Positive for neck pain. Negative for arthralgias, back pain and myalgias. Skin: Negative for rash and wound. Allergic/Immunologic: Negative for immunocompromised state. Neurological: Positive for headaches. Negative for dizziness.        Vitals:    07/06/20 1550   BP: 108/60   Pulse: 97   Resp: 18   Temp: 99.1 °F (37.3 °C)   SpO2: 99%   Weight: 91.7 kg (202 lb 2.6 oz)   Height: 5' 5\" (1.651 m)            Physical Exam  Vitals signs and nursing note reviewed. Constitutional:       General: She is not in acute distress. Appearance: She is well-developed. She is not diaphoretic. HENT:      Head: Normocephalic. Mouth/Throat:      Pharynx: No oropharyngeal exudate. Eyes:      General:         Right eye: No discharge. Left eye: No discharge. Pupils: Pupils are equal, round, and reactive to light. Neck:      Musculoskeletal: Normal range of motion and neck supple. Cardiovascular:      Rate and Rhythm: Normal rate and regular rhythm. Heart sounds: Normal heart sounds. No murmur. No friction rub. No gallop. Pulmonary:      Effort: Pulmonary effort is normal. No respiratory distress. Breath sounds: Normal breath sounds. No stridor. No wheezing or rales. Abdominal:      General: Bowel sounds are normal. There is no distension. Palpations: Abdomen is soft. Tenderness: There is no abdominal tenderness. There is no guarding or rebound. Musculoskeletal: Normal range of motion. General: No deformity. Skin:     General: Skin is warm and dry. Capillary Refill: Capillary refill takes less than 2 seconds. Findings: No rash. Neurological:      Mental Status: She is alert and oriented to person, place, and time.       Comments: CN II-XII tested and intact  Speech is clear and fluid  Tongue protrusion normal  5/5 b/l strength with shoulder/elbow flexion and extension  Equal  strength  5/5 b/l strength with hip extension, knee flexion/extension  Symmetric dorsi and plantar-flexion of feet  Sensation intact in face and throughout all 4 extremities  No truncal ataxia      Psychiatric:         Behavior: Behavior normal.            Labs Reviewed:   UA not consistent with UTI  Mild hypokalemia  Normal renal function  No leukocytosis or anemia    Imaging Reviewed:   MRI/MRV of the brain shows dominant transverse sinuses with severe stenosis of bilateral distal transverse sinuses, no thrombosis, no other acute process      Course:    7:19 PM I discussed with Dr. Concepcion Cooper. Recommended discussing with neuro IR. No other thoughts at this time regarding this HA type in a pregnant female. I discussed the case with neuro interventional, Dr. Isaias Ortiz. he felt there is a chance based on MRI findings that the patient could have pseudotumor cerebri. Directed medical management for this and there would be no neuro interventional management at this time. I discussed again with neurology who felt that she should follow-up in the clinic and see neuro-ophthalmology, Dr. Judy Daley, so discussion can be made with OB regarding potentially starting her on medications. I discussed with the patient these findings and concern for possible pseudotumor cerebri. Right now her vision is okay. Headache is manageable, better with lying flat. I did discuss possibly doing LP however after discussion with patient she would rather hold off for now and follow-up. MDM:  27-year-old female presenting today with a week of headache and neck pain. Has no meningismus, fever or leukocytosis to suggest meningitis. She did have intermittent blurred vision so I did check an MRI/MRV to rule out dural venous thrombosis and incidentally it found changes consistent with possible pseudotumor cerebri. I discussed this diagnosis with the patient however given the fact that her vision right now is intact and her headache seems manageable I feel she can be discharged home. She is going to follow-up with neuro-ophthalmology as well as neurology. They will need to discuss medical management and involve OB. At this time do not feel that she would benefit from admission to the hospital.  Her labs were otherwise reassuring. Her vital signs are stable. She was discharged home with strict return precautions in no acute distress. Clinical Impression:     ICD-10-CM ICD-9-CM    1.  Nonintractable episodic headache, unspecified headache type R51 784.0            Disposition: RAMIN Johnson, DO

## 2020-07-07 NOTE — ED NOTES
8:07 PM  Spoke back with Dr. Angelita Cash regarding patient and her MRI. He states that there are some subtle changes suspicious for pseudotumor cerebra comparing past MRI and today's MRI. Notified Dr. Angelita Cash that the patient is to follow-up with neurology and that she did not want an LP at present time. He states that that would be okay is nothing is urgent on MRI at the present time.

## 2020-07-07 NOTE — ED NOTES
The patient was discharged home by  in stable condition. The patient is alert and oriented, in no respiratory distress and discharge vital signs obtained. The patient's diagnosis, condition and treatment were explained. The patient expressed understanding. No prescriptions given. No work/school note given. A discharge plan has been developed. A  was not involved in the process. Aftercare instructions were given. Pt ambulatory out of the ED.

## 2020-07-21 ENCOUNTER — OFFICE VISIT (OUTPATIENT)
Dept: NEUROLOGY | Age: 25
End: 2020-07-21

## 2020-07-21 VITALS
WEIGHT: 204.8 LBS | HEART RATE: 92 BPM | DIASTOLIC BLOOD PRESSURE: 60 MMHG | HEIGHT: 65 IN | RESPIRATION RATE: 16 BRPM | BODY MASS INDEX: 34.12 KG/M2 | SYSTOLIC BLOOD PRESSURE: 108 MMHG | OXYGEN SATURATION: 98 % | TEMPERATURE: 98.8 F

## 2020-07-21 DIAGNOSIS — H47.10 PAPILLEDEMA: Primary | ICD-10-CM

## 2020-07-21 DIAGNOSIS — G93.2 IIH (IDIOPATHIC INTRACRANIAL HYPERTENSION): ICD-10-CM

## 2020-07-21 RX ORDER — ACETAMINOPHEN 325 MG/1
TABLET ORAL
COMMUNITY

## 2020-07-21 NOTE — LETTER
7/21/20 Patient: Naima Sarmiento YOB: 1995 Date of Visit: 7/21/2020 GERARDO Lisa 
[address] [place] [address]. [facility] [id number] VIA Facsimile: [phone] Dear GERARDO Lisa, Thank you for referring Ms. Naima Sarmiento to [facility] for evaluation. My notes for this consultation are attached. If you have questions, please do not hesitate to call me. I look forward to following your patient along with you.  
 
 
Sincerely, 
 
David Gore MD

## 2020-07-21 NOTE — PROGRESS NOTES
Neurology Consult      Subjective:      Bonita Bunch is a 22 y.o. female who comes in today with the following history. Is understand the sequence of events she has had a good 3-month history of headaches that have not changed bifrontal throbbing with occasional nausea and exceptional vomiting. Continuous diminished by sitting enhanced by lying down and bending over. Has a confident history of transient visual obscurations and blurry vision otherwise. Occasionally gets white flashes. This happens about once a day last about 5 minutes and no associated diplopia. Has had right ear tinnitus cardio synchronous since December 2018. Says her weight in December was 165 pounds and now it is 204 but she is also 16 weeks pregnant. Does not have a current eye doctor. Last eye exam was 2 years ago and apparently unrevealing. Had an LP in the form of an epidural with her childbirth in 2018 and ended up with a post spinal headache and a low pressure CSF phenomena issue that required treatment for 2 months inpatient at [facility]. Needless to say is not anxious about an LP now. Currently there is no snoring in the picture as an sleep disordered breathing or concerns for obstructive sleep apnea as I know it. Imaging prior to coming here included a brain MRI back on July 6 without dye normal no evidence of optic nerve sheath distention or tortuosity no posterior globe flattening no optic nerve protrusion no empty sella no basal encephaloceles etc.  Question of mild cerebellar ectopia when I viewed the films. On the MRV patient had severe stenosis of the bilateral distal transverse sinuses at the junction with the sigmoid sinuses. Current Outpatient Medications   Medication Sig Dispense Refill    acetaminophen (TylenoL) 325 mg tablet Take  by mouth every four (4) hours as needed for Pain.  butalb/acetaminophen/caffeine (FIORICET PO) Take  by mouth.       ondansetron (ZOFRAN ODT) 8 mg disintegrating tablet Take 1 Tab by mouth every eight (8) hours as needed for Nausea. 12 Tab 2    DULoxetine (Cymbalta) 60 mg capsule Take 60 mg by mouth daily.  doxylamine-pyridoxine, vit B6, (Bonjesta) 20-20 mg TbID Take 20 mg by mouth.  cefUROXime (CEFTIN) 500 mg tablet Take 500 mg by mouth two (2) times a day.  famotidine (Pepcid) 20 mg tablet Take 20 mg by mouth two (2) times a day.  loratadine 10 mg Cap Take  by mouth daily.  10 mg daily         Allergies   Allergen Reactions    Pecan Nut Swelling     Itching, numbness inside the mouth, swelling of the face    [place] Swelling     Past Medical History:   Diagnosis Date    Anxiety disorder     Asthma     Depression     Headache     Phlebitis and thrombophlebitis     vulvar varicosity    Psychiatric disorder     anxiety      Past Surgical History:   Procedure Laterality Date    HX OTHER SURGICAL      keloid x3 removal on ear      Social History     Socioeconomic History    Marital status: SINGLE     Spouse name: Not on file    Number of children: Not on file    Years of education: Not on file    Highest education level: Not on file   Occupational History    Not on file   Social Needs    Financial resource strain: Not on file    Food insecurity     Worry: Not on file     Inability: Not on file    Transportation needs     Medical: Not on file     Non-medical: Not on file   Tobacco Use    Smoking status: Never Smoker    Smokeless tobacco: Never Used   Substance and Sexual Activity    Alcohol use: No    Drug use: No    Sexual activity: Yes     Partners: Male     Birth control/protection: None   Lifestyle    Physical activity     Days per week: Not on file     Minutes per session: Not on file    Stress: Not on file   Relationships    Social connections     Talks on phone: Not on file     Gets together: Not on file     Attends Pentecostalism service: Not on file     Active member of club or organization: Not on file     Attends meetings of clubs or organizations: Not on file     Relationship status: Not on file    Intimate partner violence     Fear of current or ex partner: Not on file     Emotionally abused: Not on file     Physically abused: Not on file     Forced sexual activity: Not on file   Other Topics Concern     Service Not Asked    Blood Transfusions Not Asked    Caffeine Concern Not Asked    Occupational Exposure Not Asked    Hobby Hazards Not Asked    Sleep Concern Not Asked    Stress Concern Not Asked    Weight Concern Not Asked    Special Diet Not Asked    Back Care Not Asked    Exercise Not Asked    Bike Helmet Not Asked   [address] Not Asked    Self-Exams Not Asked   Social History Narrative    Not on file      Family History   Problem Relation Age of Onset    Headache Mother     Cancer Father     Headache Sister       Visit Vitals  /60   Pulse 92   Temp 98.8 °F (37.1 °C)   Resp 16   Ht 5' 5\" (1.651 m)   Wt 92.9 kg (204 lb 12.8 oz)   LMP 03/23/2020 (Exact Date)   SpO2 98%   Breastfeeding Unknown   BMI 34.08 kg/m²        Review of Systems:   A comprehensive review of systems was negative except for that written in the HPI. Neuro Exam:     Appearance: The patient is well developed, well nourished, provides a coherent history and is in no acute distress. Mental Status: Oriented to time, place and person. Mood and affect appropriate. Cranial Nerves:   Intact visual fields to confrontation. Fundi are remarkable for what appears to be at least grade 3 papilledema she is got some obscuration of para papillary blood vessels and she has a retinal nerve fiber layer hemorrhage at 4:00 in the right eye. LINDA, EOM's full, no nystagmus, no ptosis. Facial sensation is normal. Corneal reflexes are intact. Facial movement is symmetric. Hearing is normal bilaterally. Palate is midline with normal sternocleidomastoid and trapezius muscles are normal. Tongue is midline.   Visual acuity near with correction 20/20 OU APD negative. Motor:  5/5 strength in upper and lower proximal and distal muscles. Normal bulk and tone. No fasciculations. Reflexes:   Deep tendon reflexes 2+/4 and symmetrical.   Sensory:   Normal to touch, pinprick and vibration. Gait:  Normal gait. Tremor:   No tremor noted. Cerebellar:  No cerebellar signs present. Neurovascular:  Normal heart sounds and regular rhythm, peripheral pulses intact, and no carotid bruits. Assessment:   Papilledema and suspect idiopathic and cranial hypertension. Would like to share the case much sooner rather than later with [facility] and Dr. [facility]. Please see above dictation as it goes to her reaction to lumbar puncture and at 16 weeks pregnant the OB/GYN may not like the idea of Diamox or similar products. Object is to preserve vision and we will pend revisit here until I can catch up with the [facility] physicians. Plan:   Revisit pended.   Signed by :  Stephanie Ibarra MD

## 2020-07-21 NOTE — PATIENT INSTRUCTIONS
Patient history reviewed patient examined. Patient with a rather impressive chronic headache picture for 3 months now. By exam today she has what I think is flagrant papilledema and would like to see her case sooner rather than later with [facility]. Currently not interested in a lumbar puncture and it could well be that the OB/GYN is going to have problems with drugs like Diamox at 16 weeks pregnancy. We will pend revisit here clearly we need to keep a close eye on things and preserve vision at all cost.  We briefly talked about prudent weight loss but that not in the picture now obviously. Currently no concerns for sleep disordered breathing such as obstructive sleep apnea.   Not interested in LP as she had an unfortunate bad experience before with a 2-month hospitalization at [facility] for post spinal headache etc.

## 2020-07-22 ENCOUNTER — TELEPHONE (OUTPATIENT)
Dept: NEUROLOGY | Age: 25
End: 2020-07-22

## 2020-07-22 DIAGNOSIS — G93.2 IIH (IDIOPATHIC INTRACRANIAL HYPERTENSION): ICD-10-CM

## 2020-07-22 DIAGNOSIS — H47.10 PAPILLEDEMA: Primary | ICD-10-CM

## 2020-07-22 NOTE — TELEPHONE ENCOUNTER
Pt wants to know have you all been able to speak with the other doctor to set up her appointment because she's been in a lot of pain

## 2020-07-23 ENCOUNTER — TELEPHONE (OUTPATIENT)
Dept: NEUROLOGY | Age: 25
End: 2020-07-23

## 2020-07-23 NOTE — PROGRESS NOTES
This was a conversation that I had with Dr. Torey Figueroa of [facility]([phone]). He has evaluated this patient and does note obvious papilledema OU and with her state of pregnancy and the options for treatment the following plan was drawn up. Unfortunately had a very bad experience with an inadvertent dural leak, from an epidural block, and was at [facility] for prolonged hospital stay with low pressure/volume CSF leak and symptoms. Suffice it to say she does not want a lumbar puncture. The literature says that the Diamox and related medicines are category C for pregnancy so this is going to be our plan of action.  The [employer] will follow Ms. Cynthia Vitale along and let me know if there is any dangerous trending on visual fields and acuity etc.  At that point would need to put into play whatever is needed to preserve vision at that juncture. jjhmd

## 2020-07-23 NOTE — TELEPHONE ENCOUNTER
Dr. Gualberto Dominguez with [facility] wanted to s/w Dr. Torito Cardona about recent eye exam on pt.   He requests call back on his cell at [phone]

## 2020-07-27 NOTE — TELEPHONE ENCOUNTER
Dr. Porter Castelan calling again to speak with Dr. Adia Dubose to update him on patient. Non urgent. Can be reached at [phone], personal cell.

## 2020-07-29 RX ORDER — ACETAZOLAMIDE 500 MG/1
CAPSULE, EXTENDED RELEASE ORAL
Qty: 60 CAP | Refills: 5 | Status: SHIPPED | OUTPATIENT
Start: 2020-07-29 | End: 2021-02-24 | Stop reason: SDUPTHER

## 2020-07-29 NOTE — PROGRESS NOTES
This is an addendum note on the case of Ms. Michael Duran of 2020. I got a call from Dr. [employer] her eye doctor and we discussed the possibility of starting her on the Diamox as he had talked with her OB/GYN Dr. Lorene Greco about her case of IIH. She is okay with the notion of intervening with the Diamox but has some natural reservations to a product like Topamax which I understand. I told Dr. [employer] that my inclination would be to start the Diamox and I contacted Dr. Marilu Boeck and she is on board with this idea as well. I am thinking it would make an even more reassuring gesture on the occasion of a vaginal delivery instead of a  at the time of birth. Dr. Marilu Boeck had other ideas as well as it goes to vaginal birth in case there has to be additional means of support at the time of delivery. I call the patient and got an immediate response but somehow the connection was poor and I redialed but there was no pickup on the phone. I left a message with the patient at the number [phone] -which is her home phone. This included the information that I talked with Dr. [employer] and Dr. Marilu Boeck earlier this morning and my intentions to start the medicine Diamox sustained release. The order has been scripted and sent to her pharmacy and gave her the office number in case she wanted to call me back in the meantime. She sees Dr. [employer] on the  of next month in follow-up. This will be a revealing visit in terms of her previous encounter with him and since being started on the Diamox. reno

## 2020-07-30 ENCOUNTER — TELEPHONE (OUTPATIENT)
Dept: NEUROLOGY | Age: 25
End: 2020-07-30

## 2020-07-30 NOTE — TELEPHONE ENCOUNTER
----- Message from Jeffy Hutton sent at 7/30/2020  2:06 PM EDT -----  Regarding: Dr. Danielle Reid  General Message/Vendor Calls    Caller's first and last name:      Reason for call:  Verify which pharmacy Rx was sent to.      Callback required yes/no and why: yes       Best contact number(s): [phone]      Details to clarify the request:      Jeffy Hutton

## 2020-07-31 NOTE — TELEPHONE ENCOUNTER
Pt notified diamox was sent [facility] [place]

## 2021-02-09 NOTE — ED TRIAGE NOTES
Patient has not responded after calling to begin triage, registration states that patient left before triage. [Follow-Up - Clinic] : a clinic follow-up of [FreeTextEntry2] : Perioarditis

## 2021-02-24 NOTE — TELEPHONE ENCOUNTER
----- Message from Anselmo Haq sent at 2/24/2021  3:24 PM EST -----  Regarding: Dr. Marco Hooper  General Message/Vendor Calls    Caller's first and last name: Gregory Newby patient's sister       Reason for call: States pt need to have a temporary refill of her medication Diamox until her appt on 3/3 due to she has 3 children and cannot function without the medication.       Callback required yes/no and why: yes      Best contact number(s):[phone]      Details to clarify the request:      Anselmo Haq

## 2021-02-24 NOTE — TELEPHONE ENCOUNTER
Patient missed appt yesterday with Dr. Renata Cooper and she is completely out of her Diamox.  She is scheduled for an upcoming appt on 3/3 w/ NP.

## 2021-02-25 RX ORDER — ACETAZOLAMIDE 500 MG/1
CAPSULE, EXTENDED RELEASE ORAL
Qty: 60 CAP | Refills: 1 | Status: SHIPPED | OUTPATIENT
Start: 2021-02-25 | End: 2021-08-03

## 2021-02-26 RX ORDER — ACETAZOLAMIDE 500 MG/1
CAPSULE, EXTENDED RELEASE ORAL
Qty: 60 CAP | Refills: 1 | Status: SHIPPED | OUTPATIENT
Start: 2021-02-26 | End: 2021-10-05 | Stop reason: SDUPTHER

## 2021-08-03 RX ORDER — ACETAZOLAMIDE 500 MG/1
CAPSULE, EXTENDED RELEASE ORAL
Qty: 60 CAPSULE | Refills: 0 | Status: SHIPPED | OUTPATIENT
Start: 2021-08-03 | End: 2021-09-03

## 2021-09-03 RX ORDER — ACETAZOLAMIDE 500 MG/1
CAPSULE, EXTENDED RELEASE ORAL
Qty: 60 CAPSULE | Refills: 0 | Status: SHIPPED | OUTPATIENT
Start: 2021-09-03 | End: 2021-12-01 | Stop reason: SDUPTHER

## 2021-10-05 RX ORDER — ACETAZOLAMIDE 500 MG/1
CAPSULE, EXTENDED RELEASE ORAL
Qty: 60 CAPSULE | Refills: 0 | Status: SHIPPED | OUTPATIENT
Start: 2021-10-05 | End: 2021-12-01 | Stop reason: SDUPTHER

## 2021-10-05 NOTE — TELEPHONE ENCOUNTER
Appt with Nimo Griffith 11/1/21    Last seen 7/21/2020    Pt would like Diamox refilled to tide her over.

## 2021-10-05 NOTE — TELEPHONE ENCOUNTER
----- Message from [employer] sent at 10/5/2021  3:05 PM EDT -----  Regarding: /Refill  Medication Refill    Caller (if not patient):      Relationship of caller (if not patient): Self       Best contact number(s): [phone]      Name of medication and dosage if known: acetaZOLAMIDE SR (DIAMOX) 500 mg capsule [[id number]]      Is patient out of this medication (yes/no): Yes       Pharmacy name: [facility] listed in chart? (yes/no): Yes  Pharmacy phone number: Phone:  [phone]  Fax:  [phone]       Details to clarify the request: Refill needed until she can get to her appt      [employer]

## 2021-12-01 RX ORDER — ACETAZOLAMIDE 500 MG/1
CAPSULE, EXTENDED RELEASE ORAL
Qty: 60 CAPSULE | Refills: 0 | Status: SHIPPED | OUTPATIENT
Start: 2021-12-01 | End: 2021-12-02 | Stop reason: SDUPTHER

## 2021-12-01 RX ORDER — ACETAZOLAMIDE 500 MG/1
CAPSULE, EXTENDED RELEASE ORAL
Qty: 60 CAPSULE | Refills: 1 | Status: SHIPPED | OUTPATIENT
Start: 2021-12-01 | End: 2021-12-02 | Stop reason: SDUPTHER

## 2021-12-02 ENCOUNTER — OFFICE VISIT (OUTPATIENT)
Dept: NEUROLOGY | Age: 26
End: 2021-12-02
Payer: COMMERCIAL

## 2021-12-02 VITALS — HEART RATE: 77 BPM | SYSTOLIC BLOOD PRESSURE: 118 MMHG | DIASTOLIC BLOOD PRESSURE: 72 MMHG | OXYGEN SATURATION: 98 %

## 2021-12-02 DIAGNOSIS — G93.2 IIH (IDIOPATHIC INTRACRANIAL HYPERTENSION): Primary | ICD-10-CM

## 2021-12-02 PROCEDURE — 99214 OFFICE O/P EST MOD 30 MIN: CPT | Performed by: NURSE PRACTITIONER

## 2021-12-02 RX ORDER — ACETAZOLAMIDE 500 MG/1
1000 CAPSULE, EXTENDED RELEASE ORAL 2 TIMES DAILY
Qty: 120 CAPSULE | Refills: 2 | Status: SHIPPED | OUTPATIENT
Start: 2021-12-02 | End: 2022-07-14

## 2021-12-02 NOTE — PROGRESS NOTES
Pt still having the headaches daily   Still having the pounding sound in left ear  Compared to the sound of a heart beat  Numbness of left arm and leg only when headaches occur  Treats with diamox helps resolve all symptoms  Can tell when arm and leg are about to go numb.  First a tingle feeling then they go numb  Headaches occur randomly  Light, smell, sound sensitivity  At the worst of the headaches it can get to an 8  When limb goes numb without the tingle pt tends to collapse   When limb is numb has no control

## 2021-12-02 NOTE — PROGRESS NOTES
[address]  [address]   [address] [place] [address] [facility]    [phone] Office   [phone] Fax           Date:  21     Name:  Erin Gordon  :  1995  MRN:  [id number]     PCP:  GERARDO Phillips    Chief Complaint   Patient presents with    Follow-up     chato       HISTORY OF PRESENT ILLNESS: Follow up for headaches. She was initially seen by Dr. Kraig Ceja in 2020. At that time, she had papilledema, abnormal visual field testing, visual disturbance, and headache associated with nausea, vomiting. By history, she had an epidural with her second child in  and ended up with a post spinal headache and a low pressure CSF that required treatment for 2 months inpatient at [facility]. As such, no opening pressure LP was performed but she did have an MRI of the brain which was normal and on MRV patient had severe stenosis of the bilateral distal transverse sinuses at the junction with the sigmoid sinuses. She was diagnosed with IIH and started on Diamox. EXAM: MRI BRAIN WO CONT, MRI BRAIN MRV WO CONT (2020)     INDICATION: eval for Dural venous thrombosis     COMPARISON: MRI brain 2011.     CONTRAST: None.     TECHNIQUE:    Multiplanar multisequence acquisition without contrast of the brain. 3-D time-of-flight noncontrast MRV of the brain was performed. Multiplanar and  MIP reconstructions were obtained.     FINDINGS:  MRI brain:  The ventricles are normal in size and position. The brain parenchyma has normal  signal characteristics. There is no acute infarct, hemorrhage, extra-axial fluid  collection, or mass effect. There is no cerebellar tonsillar herniation. Expected arterial flow-voids are present.     Retention cyst in the left maxillary sinus, with scattered mucosal thickening in  the bilateral ethmoidal air cells and maxillary sinuses without air-fluid level.   The mastoid air cells and middle ears are clear. The orbital contents are within  normal limits. No significant osseous or scalp lesions are identified.     MRV head: The dural venous sinuses and deep cerebral veins are patent without evidence of  thrombosis. There is right dominant transverse sinus, with severe stenosis of  the bilateral distal transverse sinuses.     IMPRESSION  IMPRESSION:   MRI brain:  1. No acute or significant intracranial abnormality.     MRV head:  1. No evidence of venous sinus thrombosis. 2. 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        Current Outpatient Medications   Medication Sig    acetaZOLAMIDE SR (DIAMOX) 500 mg capsule TAKE 1 CAPSULE BY MOUTH TWICE DAILY **MUST KEEP 11/1/21 APPT- no refills**    acetaZOLAMIDE SR (DIAMOX) 500 mg capsule Take 1 capsule by mouth twice daily    acetaminophen (TylenoL) 325 mg tablet Take  by mouth every four (4) hours as needed for Pain.  butalb/acetaminophen/caffeine (FIORICET PO) Take  by mouth.  cefUROXime (CEFTIN) 500 mg tablet Take 500 mg by mouth two (2) times a day.  famotidine (Pepcid) 20 mg tablet Take 20 mg by mouth two (2) times a day.  ondansetron (ZOFRAN ODT) 8 mg disintegrating tablet Take 1 Tab by mouth every eight (8) hours as needed for Nausea.  DULoxetine (Cymbalta) 60 mg capsule Take 60 mg by mouth daily.  doxylamine-pyridoxine, vit B6, (Bonjesta) 20-20 mg TbID Take 20 mg by mouth.  loratadine 10 mg Cap Take  by mouth daily. 10 mg daily      No current facility-administered medications for this visit.      Allergies   Allergen Reactions    Pecan Nut Swelling     Itching, numbness inside the mouth, swelling of the face   ACUITY [facility] [place] Swelling     Past Medical History:   Diagnosis Date    Anxiety disorder     Asthma     Depression     Headache     Phlebitis and thrombophlebitis     vulvar varicosity    Psychiatric disorder     anxiety     Past Surgical History:   Procedure Laterality Date    HX OTHER SURGICAL      keloid x3 removal on ear     Social History     Socioeconomic History    Marital status: SINGLE     Spouse name: Not on file    Number of children: Not on file    Years of education: Not on file    Highest education level: Not on file   Occupational History    Not on file   Tobacco Use    Smoking status: Never Smoker    Smokeless tobacco: Never Used   Substance and Sexual Activity    Alcohol use: No    Drug use: No    Sexual activity: Yes     Partners: Male     Birth control/protection: None   Other Topics Concern     Service Not Asked    Blood Transfusions Not Asked    Caffeine Concern Not Asked    Occupational Exposure Not Asked    Hobby Hazards Not Asked    Sleep Concern Not Asked    Stress Concern Not Asked    Weight Concern Not Asked    Special Diet Not Asked    Back Care Not Asked    Exercise Not Asked    Bike Helmet Not Asked    Seat Belt Not Asked    Self-Exams Not Asked   Social History Narrative    Not on file     Social Determinants of Health     Financial Resource Strain:     Difficulty of Paying Living Expenses: Not on file   Food Insecurity:     Worried About Running Out of Food in the Last Year: Not on file    Kristal of Food in the Last Year: Not on file   Transportation Needs:     Lack of Transportation (Medical): Not on file    Lack of Transportation (Non-Medical):  Not on file   Physical Activity:     Days of Exercise per Week: Not on file    Minutes of Exercise per Session: Not on file   Stress:     Feeling of Stress : Not on file   Social Connections:     Frequency of Communication with Friends and Family: Not on file    Frequency of Social Gatherings with Friends and Family: Not on file    Attends Pentecostal Services: Not on file    Active Member of Clubs or Organizations: Not on file    Attends Club or Organization Meetings: Not on file    Marital Status: Not on file   Intimate Partner Violence:     Fear of Current or Ex-Partner: Not on file    Emotionally Abused: Not on file    Physically Abused: Not on file    Sexually Abused: Not on file   Housing Stability:     Unable to Pay for Housing in the Last Year: Not on file    Number of Places Lived in the Last Year: Not on file    Unstable Housing in the Last Year: Not on file     Family History   Problem Relation Age of Onset    Headache Mother     Cancer Father     Headache Sister        PHYSICAL EXAMINATION:    Visit Vitals  /72 (BP 1 Location: Left arm, BP Patient Position: Sitting, BP Cuff Size: Adult)   Pulse 77   SpO2 98%     General:  Well defined, nourished, and groomed individual in no acute distress. Neck: Supple, nontender, no bruits, no pain with resistance to active range of motion. Heart: Regular rate and rhythm, no murmurs, rub, or gallop. Normal S1S2. Lungs:  Clear to auscultation bilaterally with equal chest expansion, no cough, no wheeze  Musculoskeletal:  Extremities revealed no edema and had full range of motion of joints. Psych:  Good mood and bright affect    NEUROLOGICAL EXAMINATION:     Mental Status:   Alert and oriented to person, place, and time with recent and remote memory intact. Attention span and concentration are normal. Speech is fluent with a full fund of knowledge. Cranial Nerves:  I: smell Not tested   II: visual fields Full to confrontation   II: pupils Equal, round, reactive to light   II: optic disc No papilledema   III,VII: ptosis none   III,IV,VI: extraocular muscles  Full ROM   V: mastication normal   V: facial light touch sensation  normal   VII: facial muscle function   symmetric   VIII: hearing symmetric   IX: soft palate elevation  normal   XI: trapezius strength  5/5   XI: sternocleidomastoid strength 5/5   XI: neck flexion strength  5/5   XII: tongue  midline     Motor Examination: Normal tone, bulk, and strength, 5/5 muscle strength throughout.       Coordination: Finger to nose and rapid arm movement testing was normal.   No resting or intention tremor    Gait and Station:  Steady while walking. Normal arm swing. No pronator drift. No muscle wasting or fasiculations noted. Reflexes:  DTRs 2+ throughout. ASSESSMENT AND PLAN    ICD-10-CM ICD-9-CM    1. IIH (idiopathic intracranial hypertension)  G93.2 348.2 acetaZOLAMIDE SR (DIAMOX) 500 mg capsule      REFERRAL TO NEUROINTERVENTIONAL SURGERY     Ongoing chronic daily headaches which are better with use of the Diamox but can escalate in severity. Given MRV results, recommended that she have a consult with neurointerventional services. Continue with Diamox for now but will try to push the dose for effect. Follow up in three months. Kaleigh Cooper

## 2022-03-19 PROBLEM — Z34.82 ENCOUNTER FOR SUPERVISION OF OTHER NORMAL PREGNANCY, SECOND TRIMESTER: Status: ACTIVE | Noted: 2018-07-19

## 2022-05-30 NOTE — PATIENT INSTRUCTIONS

## 2023-01-17 ENCOUNTER — OFFICE VISIT (OUTPATIENT)
Dept: NEUROLOGY | Age: 28
End: 2023-01-17

## 2023-01-17 VITALS
OXYGEN SATURATION: 99 % | SYSTOLIC BLOOD PRESSURE: 108 MMHG | RESPIRATION RATE: 20 BRPM | HEART RATE: 71 BPM | DIASTOLIC BLOOD PRESSURE: 68 MMHG

## 2023-01-17 DIAGNOSIS — G43.009 MIGRAINE WITHOUT AURA AND WITHOUT STATUS MIGRAINOSUS, NOT INTRACTABLE: Primary | ICD-10-CM

## 2023-01-17 PROCEDURE — 99214 OFFICE O/P EST MOD 30 MIN: CPT | Performed by: SPECIALIST

## 2023-01-17 RX ORDER — CETIRIZINE HYDROCHLORIDE 10 MG/1
10 TABLET ORAL DAILY
COMMUNITY

## 2023-01-17 RX ORDER — SUMATRIPTAN 100 MG/1
100 TABLET, FILM COATED ORAL
Qty: 9 TABLET | Refills: 3 | Status: SHIPPED | OUTPATIENT
Start: 2023-01-17 | End: 2023-01-17

## 2023-01-17 RX ORDER — CYCLOBENZAPRINE HCL 10 MG
10 TABLET ORAL
COMMUNITY

## 2023-01-17 NOTE — PROGRESS NOTES
Headaches- lately have been bad   Worse at night, having trouble with sleeping because of them   Daily- rescue ibuprofen as rescue

## 2023-01-17 NOTE — PROGRESS NOTES
Neurology Consult      Subjective:      Britney Albright is a 33 y.o. female who comes in today since revisit over a year ago. Has done well up until 2 months ago when she had the onset of persistent headaches that will be described. Ended up losing 48 pounds over 4 months or so in 2022. Current headache pattern is several per week and bilateral and around the eyes- pulsatile without nausea or vomiting and no visual obscurations or breakout diplopia nor tinnitus. Is enhanced by light and noise. Has been previously seen by Dr. [facility] when she was an IIH concern and treated with Topamax which she did not tolerate well and had been on Diamox. My suggestion will be and lieu of recent events to stop the Diamox and will have her catch up with Dr. [facility] with these new developments. By virtue of being on Topamax before and having seasonal asthma would not be a smart beta-blocker candidate. Therefore she has probably earned the right to try Nurtec as an every other day preventative drug for migraines and Imitrex as a legitimate migraine rescue drug etc.  Will let us know if Nurtec does help and we can go from there. Current Outpatient Medications   Medication Sig Dispense Refill    cetirizine (ZYRTEC) 10 mg tablet Take 10 mg by mouth daily. ketorolac tromethamine (TORADOL PO) Take  by mouth as needed. cyclobenzaprine (FLEXERIL) 10 mg tablet Take 10 mg by mouth three (3) times daily as needed for Muscle Spasm(s). SUMAtriptan (IMITREX) 100 mg tablet Take 1 Tablet by mouth once as needed for Migraine for up to 1 dose. 9 Tablet 3    acetaZOLAMIDE SR (DIAMOX) 500 mg capsule Take 2 capsules by mouth twice daily (Patient taking differently: Take 1,000 mg by mouth daily.) 120 Capsule 1    acetaminophen (TYLENOL) 325 mg tablet Take  by mouth every four (4) hours as needed for Pain. DULoxetine (CYMBALTA) 60 mg capsule Take 60 mg by mouth daily. butalb/acetaminophen/caffeine (FIORICET PO) Take  by mouth. (Patient not taking: Reported on 1/17/2023)      cefUROXime (CEFTIN) 500 mg tablet Take 500 mg by mouth two (2) times a day. (Patient not taking: Reported on 1/17/2023)      famotidine (PEPCID) 20 mg tablet Take 20 mg by mouth two (2) times a day. (Patient not taking: Reported on 1/17/2023)      ondansetron (ZOFRAN ODT) 8 mg disintegrating tablet Take 1 Tab by mouth every eight (8) hours as needed for Nausea. (Patient not taking: Reported on 1/17/2023) 12 Tab 2    doxylamine-pyridoxine, vit B6, (Bonjesta) 20-20 mg TbID Take 20 mg by mouth. (Patient not taking: Reported on 1/17/2023)      loratadine 10 mg Cap Take  by mouth daily.  10 mg daily  (Patient not taking: Reported on 1/17/2023)        Allergies   Allergen Reactions    Pecan Nut Swelling     Itching, numbness inside the mouth, swelling of the face    [facility] Swelling     Past Medical History:   Diagnosis Date    Anxiety disorder     Asthma     Depression     Headache     Phlebitis and thrombophlebitis     vulvar varicosity    Psychiatric disorder     anxiety      Past Surgical History:   Procedure Laterality Date    HX OTHER SURGICAL      keloid x3 removal on ear      Social History     Socioeconomic History    Marital status: SINGLE     Spouse name: Not on file    Number of children: Not on file    Years of education: Not on file    Highest education level: Not on file   Occupational History    Not on file   Tobacco Use    Smoking status: Never    Smokeless tobacco: Never   Substance and Sexual Activity    Alcohol use: No    Drug use: No    Sexual activity: Yes     Partners: Male     Birth control/protection: None   Other Topics Concern     Service Not Asked    Blood Transfusions Not Asked    Caffeine Concern Not Asked    Occupational Exposure Not Asked    Hobby Hazards Not Asked    Sleep Concern Not Asked    Stress Concern Not Asked    Weight Concern Not Asked    Special Diet Not Asked Back Care Not Asked    Exercise Not Asked    Bike Helmet Not Asked    Seat Belt Not Asked    Self-Exams Not Asked   Social History Narrative    Not on file     Social Determinants of Health     Financial Resource Strain: Not on file   Food Insecurity: Not on file   Transportation Needs: Not on file   Physical Activity: Not on file   Stress: Not on file   Social Connections: Not on file   Intimate Partner Violence: Not on file   Housing Stability: Not on file      Family History   Problem Relation Age of Onset    Headache Mother     Cancer Father     Headache Sister       Visit Vitals  /68 (BP 1 Location: Left upper arm, BP Patient Position: Sitting, BP Cuff Size: Adult)   Pulse 71   Resp 20   SpO2 99%        Review of Systems:   A comprehensive review of systems was negative except for that written in the HPI. Neuro Exam:     Appearance: The patient is well developed, well nourished, provides a coherent history and is in no acute distress. Mental Status: Oriented to time, place and person. Mood and affect appropriate. Cranial Nerves:   Intact visual fields to static hand confrontation. Fundi are benign with defined SVP. LINDA, EOM's full, no nystagmus, no ptosis. Facial sensation is normal. Corneal reflexes are intact. Facial movement is symmetric. Hearing is normal bilaterally. Palate is midline with normal sternocleidomastoid and trapezius muscles are normal. Tongue is midline. Visual acuity near without correction 20/20 OU and APD negative. Motor:  5/5 strength in upper and lower proximal and distal muscles. Normal bulk and tone. No fasciculations. Reflexes:   Deep tendon reflexes 2+/4 and symmetrical.   Sensory:   Normal to touch, pinprick and vibration. Gait:  Normal gait. Tremor:   No tremor noted. Cerebellar:  No cerebellar signs present. Neurovascular:  Normal heart sounds and regular rhythm, peripheral pulses intact, and no carotid bruits. Assessment:   Headaches. GERD description of headaches fits a migraine pattern as I understand both history and exam findings today. Have some free samples of Nurtec she can try as an every other day preventative and if she likes it will let us know and we can prescription the drug officially. Imitrex 100 mg will be the rescue drug and can be used at moment 0 with over-the-counter remedies if and as needed. Very impressed with her weight reduction and will share with Dr. Segun Winters whose office has seen her on previous occasions when IIH was a viable concern. Plan:   Revisit here 2 months.   Signed by :  Aubrey Mir MD

## 2023-01-17 NOTE — PATIENT INSTRUCTIONS
Patient history viewed patient examined. Very impressed and pleasantly so with the weight loss and I honestly do not see evidence of increased pressure in the eyes on inspection today. Nevertheless, will refer her to Dr. Kavita Cantu ophthalmology whom she seen before and get her critique as well. Will try the drug Nurtec as an every other day preventative drug for what seemed to be misbehaving migraine headaches for the last 2+ months. If that works please let us know so that we can officially prescription the drug through the pharmacy. We will use the drug Imitrex as a rescue drug which can be used with over-the-counter remedies at moment 0. Revisit in 2 months and see how we are doing.

## 2023-01-17 NOTE — LETTER
1/17/2023    Patient: Gilma Anglin   YOB: 1995   Date of Visit: 1/17/2023     [address] [address]  Via Fax: [phone]    Dear GERARDO Elam,      Thank you for referring Ms. Gilma Anglin to [facility] for evaluation. My notes for this consultation are attached. If you have questions, please do not hesitate to call me. I look forward to following your patient along with you.       Sincerely,    Jose Aguilar MD

## 2023-01-19 ENCOUNTER — TELEPHONE (OUTPATIENT)
Dept: NEUROLOGY | Age: 28
End: 2023-01-19

## 2023-01-19 RX ORDER — ACETAZOLAMIDE 500 MG/1
500 CAPSULE, EXTENDED RELEASE ORAL 2 TIMES DAILY
Qty: 60 CAPSULE | Refills: 3 | Status: SHIPPED | OUTPATIENT
Start: 2023-01-19

## 2023-01-19 NOTE — TELEPHONE ENCOUNTER
Patient was sent message via My Chart and she was advised that a prescription was sent to her pharmacy. And to please keep he follow up appt in March.

## 2024-09-18 RX ORDER — ACETAZOLAMIDE 500 MG/1
1000 CAPSULE, EXTENDED RELEASE ORAL 2 TIMES DAILY
Qty: 120 CAPSULE | Refills: 2 | Status: SHIPPED | OUTPATIENT
Start: 2024-09-18

## 2024-09-25 ENCOUNTER — TELEPHONE (OUTPATIENT)
Age: 29
End: 2024-09-25

## 2024-09-25 NOTE — TELEPHONE ENCOUNTER
Patient was informed by her pharmacy [facility] on [address] she has been denied her medication Acetazolamie due to it being written for 4 X's a day and she takes it twice a day.

## 2024-10-01 RX ORDER — ACETAZOLAMIDE 500 MG/1
500 CAPSULE, EXTENDED RELEASE ORAL 2 TIMES DAILY
Qty: 60 CAPSULE | Refills: 0 | OUTPATIENT
Start: 2024-10-01

## 2024-10-03 NOTE — TELEPHONE ENCOUNTER
Clarification from the patient, refill for the exact amount she takes was sent to the provider and it was corrected and sent into the pharmacy.

## 2024-10-17 ENCOUNTER — TELEMEDICINE (OUTPATIENT)
Age: 29
End: 2024-10-17
Payer: MEDICAID

## 2024-10-17 DIAGNOSIS — G93.2 IDIOPATHIC INTRACRANIAL HYPERTENSION: Primary | ICD-10-CM

## 2024-10-17 PROCEDURE — 99214 OFFICE O/P EST MOD 30 MIN: CPT | Performed by: NURSE PRACTITIONER

## 2024-10-17 RX ORDER — ACETAZOLAMIDE 250 MG/1
250 TABLET ORAL 2 TIMES DAILY
Qty: 60 TABLET | Refills: 3 | Status: SHIPPED | OUTPATIENT
Start: 2024-10-17

## 2024-10-17 NOTE — PROGRESS NOTES
[facility] MEDICAL/EMERGENCY CENTER  NEUROLOGY CLINIC   [address] [place] [address]   [phone] Office   [phone] Fax           Date:  10/17/24     Name:  ALLISON SINGER  :  1995  MRN:  [id number]     PCP:  Jeffrey Hubbard PA    Allison Singer is a 29 y.o. female who was seen by synchronous (real-time) audio-video technology on 10/17/2024 for IIH    Subjective:   Stopped taking the Diamox during her pregnancy and did fine. She delivered in May of this year. In the last couple of months, she started having the headaches, bitemporal and at the base of her neck. This is associated with left sided numbness, dizziness and nausea. These symptoms are similar to when she was first diagnosed with IIH. While on Diamox she did well. She was also being followed by Dr. Kim for visual field testing. 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     Current Outpatient Medications   Medication Sig    acetaminophen (TYLENOL) 325 MG tablet Take by mouth every 4 hours as needed    DULoxetine (CYMBALTA) 60 MG extended release capsule Take 1 capsule by mouth daily    ondansetron (ZOFRAN-ODT) 8 MG TBDP disintegrating tablet Take 1 tablet by mouth every 8 hours as needed     No current facility-administered medications for this visit.     Allergies   Allergen Reactions    Macadamia Nut Oil Swelling    Pecan Extract Swelling     Itching, numbness inside the mouth, swelling of the face      Past Medical History:   Diagnosis Date    Anxiety disorder     Asthma     Depression     Headache     Phlebitis and thrombophlebitis     vulvar varicosity    Psychiatric disorder     anxiety     Past Surgical History:   Procedure Laterality Date    OTHER SURGICAL HISTORY      keloid x3 removal on ear        reports that she has never smoked. She has never used smokeless tobacco. She

## 2024-11-01 RX ORDER — SODIUM CHLORIDE 9 MG/ML
INJECTION, SOLUTION INTRAVENOUS PRN
Status: DISCONTINUED | OUTPATIENT
Start: 2024-11-01 | End: 2024-11-04 | Stop reason: HOSPADM

## 2024-11-01 RX ORDER — SODIUM CHLORIDE 0.9 % (FLUSH) 0.9 %
5-40 SYRINGE (ML) INJECTION EVERY 12 HOURS SCHEDULED
Status: DISCONTINUED | OUTPATIENT
Start: 2024-11-01 | End: 2024-11-04 | Stop reason: HOSPADM

## 2024-11-01 RX ORDER — SODIUM CHLORIDE 0.9 % (FLUSH) 0.9 %
5-40 SYRINGE (ML) INJECTION PRN
Status: DISCONTINUED | OUTPATIENT
Start: 2024-11-01 | End: 2024-11-04 | Stop reason: HOSPADM

## 2024-11-03 ENCOUNTER — ANESTHESIA EVENT (OUTPATIENT)
Facility: HOSPITAL | Age: 29
End: 2024-11-03
Payer: MEDICAID

## 2024-11-03 NOTE — ANESTHESIA PRE PROCEDURE
Department of Anesthesiology  Preprocedure Note       Name:  Govind Singer   Age:  29 y.o.  :  1995                                          MRN:  [id number]         Date:  11/3/2024      Surgeon: Surgeon(s):  Mathieu Abreu MD    Procedure: Procedure(s):  COLONOSCOPY, EGD    Medications prior to admission:   Prior to Admission medications    Medication Sig Start Date End Date Taking? Authorizing Provider   acetaZOLAMIDE (DIAMOX) 250 MG tablet Take 1 tablet by mouth 2 times daily 10/17/24   Sophie Musa APRN - NP   acetaminophen (TYLENOL) 325 MG tablet Take by mouth every 4 hours as needed    Automatic Reconciliation, Ar   DULoxetine (CYMBALTA) 60 MG extended release capsule Take 1 capsule by mouth daily    Automatic Reconciliation, Ar   ondansetron (ZOFRAN-ODT) 8 MG TBDP disintegrating tablet Take 1 tablet by mouth every 8 hours as needed 20   Automatic Reconciliation, Ar       Current medications:    Current Facility-Administered Medications   Medication Dose Route Frequency Provider Last Rate Last Admin   • sodium chloride flush 0.9 % injection 5-40 mL  5-40 mL IntraVENous 2 times per day Mathieu Abreu MD       • sodium chloride flush 0.9 % injection 5-40 mL  5-40 mL IntraVENous PRN Mathieu Abreu MD       • 0.9 % sodium chloride infusion   IntraVENous PRN Mathieu Abreu MD         Current Outpatient Medications   Medication Sig Dispense Refill   • acetaZOLAMIDE (DIAMOX) 250 MG tablet Take 1 tablet by mouth 2 times daily 60 tablet 3   • acetaminophen (TYLENOL) 325 MG tablet Take by mouth every 4 hours as needed     • DULoxetine (CYMBALTA) 60 MG extended release capsule Take 1 capsule by mouth daily     • ondansetron (ZOFRAN-ODT) 8 MG TBDP disintegrating tablet Take 1 tablet by mouth every 8 hours as needed         Allergies:    Allergies   Allergen Reactions   • Macadamia Nut Oil Swelling   • Pecan Extract Swelling     Itching, numbness inside the mouth,

## 2024-11-04 ENCOUNTER — HOSPITAL ENCOUNTER (OUTPATIENT)
Facility: HOSPITAL | Age: 29
Setting detail: OUTPATIENT SURGERY
Discharge: HOME | End: 2024-11-04
Attending: INTERNAL MEDICINE | Admitting: INTERNAL MEDICINE
Payer: MEDICAID

## 2024-11-04 ENCOUNTER — ANESTHESIA (OUTPATIENT)
Facility: HOSPITAL | Age: 29
End: 2024-11-04
Payer: MEDICAID

## 2024-11-04 VITALS
DIASTOLIC BLOOD PRESSURE: 73 MMHG | TEMPERATURE: 98.2 F | WEIGHT: 176 LBS | BODY MASS INDEX: 28.28 KG/M2 | HEIGHT: 66 IN | OXYGEN SATURATION: 99 % | RESPIRATION RATE: 19 BRPM | HEART RATE: 95 BPM | SYSTOLIC BLOOD PRESSURE: 116 MMHG

## 2024-11-04 LAB — HCG UR QL: NEGATIVE

## 2024-11-04 PROCEDURE — 2709999900 HC NON-CHARGEABLE SUPPLY: Performed by: INTERNAL MEDICINE

## 2024-11-04 PROCEDURE — 7100000011 HC PHASE II RECOVERY - ADDTL 15 MIN: Performed by: INTERNAL MEDICINE

## 2024-11-04 PROCEDURE — 2500000003 HC RX 250 WO HCPCS: Performed by: NURSE ANESTHETIST, CERTIFIED REGISTERED

## 2024-11-04 PROCEDURE — 7100000010 HC PHASE II RECOVERY - FIRST 15 MIN: Performed by: INTERNAL MEDICINE

## 2024-11-04 PROCEDURE — 81025 URINE PREGNANCY TEST: CPT

## 2024-11-04 PROCEDURE — 3600007512: Performed by: INTERNAL MEDICINE

## 2024-11-04 PROCEDURE — 88305 TISSUE EXAM BY PATHOLOGIST: CPT

## 2024-11-04 PROCEDURE — 3700000000 HC ANESTHESIA ATTENDED CARE: Performed by: INTERNAL MEDICINE

## 2024-11-04 PROCEDURE — 2580000003 HC RX 258: Performed by: INTERNAL MEDICINE

## 2024-11-04 PROCEDURE — 3700000001 HC ADD 15 MINUTES (ANESTHESIA): Performed by: INTERNAL MEDICINE

## 2024-11-04 PROCEDURE — 3600007502: Performed by: INTERNAL MEDICINE

## 2024-11-04 PROCEDURE — 6360000002 HC RX W HCPCS: Performed by: NURSE ANESTHETIST, CERTIFIED REGISTERED

## 2024-11-04 RX ORDER — PHENYLEPHRINE HCL IN 0.9% NACL 0.4MG/10ML
SYRINGE (ML) INTRAVENOUS
Status: DISCONTINUED | OUTPATIENT
Start: 2024-11-04 | End: 2024-11-04 | Stop reason: SDUPTHER

## 2024-11-04 RX ORDER — GLYCOPYRROLATE 0.2 MG/ML
INJECTION INTRAMUSCULAR; INTRAVENOUS
Status: DISCONTINUED | OUTPATIENT
Start: 2024-11-04 | End: 2024-11-04 | Stop reason: SDUPTHER

## 2024-11-04 RX ORDER — LIDOCAINE HYDROCHLORIDE 20 MG/ML
INJECTION, SOLUTION EPIDURAL; INFILTRATION; INTRACAUDAL; PERINEURAL
Status: DISCONTINUED | OUTPATIENT
Start: 2024-11-04 | End: 2024-11-04 | Stop reason: SDUPTHER

## 2024-11-04 RX ADMIN — SODIUM CHLORIDE: 9 INJECTION, SOLUTION INTRAVENOUS at 13:03

## 2024-11-04 RX ADMIN — PROPOFOL 100 MG: 10 INJECTION, EMULSION INTRAVENOUS at 13:10

## 2024-11-04 RX ADMIN — PROPOFOL 50 MG: 10 INJECTION, EMULSION INTRAVENOUS at 13:19

## 2024-11-04 RX ADMIN — Medication 80 MCG: at 13:25

## 2024-11-04 RX ADMIN — LIDOCAINE HYDROCHLORIDE 100 MG: 20 INJECTION, SOLUTION EPIDURAL; INFILTRATION; INTRACAUDAL; PERINEURAL at 13:10

## 2024-11-04 RX ADMIN — Medication 120 MCG: at 13:22

## 2024-11-04 RX ADMIN — PROPOFOL 50 MG: 10 INJECTION, EMULSION INTRAVENOUS at 13:22

## 2024-11-04 RX ADMIN — GLYCOPYRROLATE 0.2 MG: 0.2 INJECTION, SOLUTION INTRAMUSCULAR; INTRAVENOUS at 13:10

## 2024-11-04 RX ADMIN — PROPOFOL 50 MG: 10 INJECTION, EMULSION INTRAVENOUS at 13:16

## 2024-11-04 RX ADMIN — PROPOFOL 50 MG: 10 INJECTION, EMULSION INTRAVENOUS at 13:13

## 2024-11-04 ASSESSMENT — PAIN - FUNCTIONAL ASSESSMENT: PAIN_FUNCTIONAL_ASSESSMENT: NONE - DENIES PAIN

## 2024-11-04 NOTE — PROGRESS NOTES
Endoscopy Case End Note:    1319:  EGD Procedure scope was pre-cleaned, per protocol, at bedside by SHAY Villaseñor      1328: Colon scope was pre-cleaned at bedside immediately following procedure by SHAY Villaseñor     1328:  Report received from anesthesia - STEPHANIE Dial.  See anesthesia flowsheet for intra-procedure vital signs and events.    Abd soft, non-distended    Pt then taken to recovery area and SBAR report to receiving nurse

## 2024-11-04 NOTE — DISCHARGE INSTRUCTIONS
[place] Office: [phone]    Govind Singer  [id number]  1995    EGD/COLONOSCOPY DISCHARGE INSTRUCTIONS  Discomfort:  Sore throat- throat lozenges or warm salt water gargle  redness at IV site- apply warm compress to area; if redness or soreness persist- contact your physician  Gaseous discomfort- walking, belching will help relieve any discomfort  You may not operate a vehicle for 12 hours  You may not engage in an occupation involving machinery or appliances for rest of today.  You may not drink alcoholic beverages for at least 12 hours  Avoid making any critical decisions for at least 24 hour  DIET  You may resume your regular diet - however -  remember your colon is empty and a heavy meal will produce gas.   Avoid these foods:  fried / greasy foods, excessive carbonated drinks or too much caffeine  MEDICATIONS   Regarding Aspirin or Nonsteroidal medications specifically, please see below.  ACTIVITY  You may resume your normal daily activities.   Spend the remainder of the day resting -  avoid any strenuous activity.    CALL M.D.  ANY SIGN OF   Increasing pain, nausea, vomiting  Abdominal distension (swelling)  New increased bleeding (oral or rectal)  Fever (chills)  Pain in chest area  Bloody discharge from nose or mouth  Shortness of breath    You may not take any Advil, Aspirin, Ibuprofen, Motrin or Aleve(NSAIDs) for 7 days, ONLY  Tylenol as needed for pain.    Findings:  The Olympus video high-definition colonoscope was advanced to the cecum, identified by its typical land marks, with ease.  The mucosa of the colon is normal appearing to the cecum with no obvious mucosal pathology (polyp, mass lesion, colitis etc) noted on this colonoscopy.  EGD:  Esophagus:  The esophageal mucosa in the proximal, mid and distal esophagus is normal.   GE  junction erythema is noted. Mucosal  biopsies were taken,  The squamo-columnar junction is at 35-36 cm where the Z-line was noted.   A small 1 cm transiliently

## 2024-11-04 NOTE — OP NOTE
Colonoscopy Procedure Note    Govind Singer  1995  [id number]    Indications:  Please see below.    Pre-operative Diagnosis:   Family history of malignant neoplasm of gastrointestinal tract [Z80.0]  Encounter for screening colonoscopy [Z12.11]    Post-operative Diagnosis:    Normal colonoscopy    : Jamarcus Abreu MD    Referring Provider: Jeffrey Hubbard PA    Sedation:  MAC anesthesia Propofol        Procedure Details:    After detailed informed consent was obtained with all risks and benefits of procedure explained and preoperative exam completed, the patient was taken to the endoscopy suite and placed in the left lateral decubitus position.  Upon sequential sedation as per above, a digital rectal exam was performed  and was normal.  The Olympus videocolonoscope  was inserted in the rectum and carefully advanced to the cecum, which was identified by the ileocecal valve and appendiceal orifice.   The colonoscope was slowly withdrawn with careful evaluation between folds.   Retroflexion in the rectum was performed.      The quality of preparation was good    Canadian Bowel Preparation Score: 2/2/3    Findings:   The Olympus video high-definition colonoscope was advanced to the cecum, identified by its typical land marks, with ease.  The mucosa of the colon is normal appearing to the cecum with no obvious mucosal pathology (polyp, mass lesion, colitis etc) noted on this colonoscopy.    Therapies:  none    Specimen/s:  none     Complications: None were encountered during the procedure.     EBL:  None.    Recommendations:     -Repeat colonoscopy in 5 years.  -For new bleeding, unexplained weight loss, change in bowel habits and/or anemia, an earlier colonoscopy should be considered.      MERI Abreu MD  11/4/2024  1:27 PM

## 2024-11-04 NOTE — H&P
Facility-Administered Medications: None           The review of systems is:  Negative  for shortness of breath or chest pain      The heart, lungs, and mental status were satisfactory for the administration of deep sedation and for the procedure.      I discussed with the patient the objectives, risks, consequences and alternatives to the procedure.      Mathieu Abreu MD  11/4/2024  1:06 PM

## 2024-11-04 NOTE — OP NOTE
[place] Office: [phone]      Esophagogastroduodenoscopy Procedure Note      Govind Singer  1995  [id number]    Indication: Abdominal pain, epigastric     : Jamarcus Abreu MD    Referring Provider:  Jeffrey Hubbard PA    Sedation:  MAC anesthesia Propofol    Procedure Details:  After detailed informed consent was obtained for the procedure, with all risks and benefits of procedure explained the patient was taken to the endoscopy suite and placed in the left lateral decubitus position.  Following sequential administration of sedation as per above, the endoscope was inserted into the mouth and advanced under direct vision to second portion of the duodenum.  A careful inspection was made as the gastroscope was withdrawn, including a retroflexed view of the proximal stomach; findings and interventions are described below.      Findings:     Esophagus:  The esophageal mucosa in the proximal, mid and distal esophagus is normal.   GE  junction erythema is noted. Mucosal  biopsies were taken,  The squamo-columnar junction is at 35-36 cm where the Z-line was noted.   A small 1 cm transiliently appearing hiatal hernia is noted    Stomach:   The gastric mucosa has erythema in the body and antrum. Biopsies were taken  The fundus was found to be normal with no lesions noted on retroflexion. Views are partly limited  Endoscopic grading of gastroesophageal flap valve (GEFV)/Hill rdgrdrrdarddrderd:rd rd3rd The angularis is normal as well.    Duodenum:   The bulb and post bulbar mucosa is normal in appearance.   The duodenal folds are normal.     Therapies:  biopsy of esophagus  biopsy of stomach body, antrum    Specimen:  Specimens were collected as described and send to the laboratory.           Complications:   None were encountered during the procedure.    EBL:  None.          Recommendations:     -Continue acid suppression.,   -Await pathology.,   -Follow symptoms.,   -GERD diet: avoid fried and fatty foods.

## 2024-11-04 NOTE — PROGRESS NOTES
ARRIVAL INFORMATION:  Verified patient name and date of birth, scheduled procedure, and informed consent.     Belongings with patient include:  Clothing,None        GI FOCUSED ASSESSMENT:  Neuro: Awake, alert, oriented x4  Respiratory: even and unlabored   GI: soft and non-distended      Education:Reviewed general discharge instructions and  information. -Basil gave permission to text

## 2024-11-04 NOTE — ANESTHESIA POSTPROCEDURE EVALUATION
Department of Anesthesiology  Postprocedure Note    Patient: Govind Singer  MRN: [id number]  YOB: 1995  Date of evaluation: 11/4/2024    Procedure Summary       Date: 11/04/24 Room / Location: [facility] ENDO 02 / [facility] ENDOSCOPY    Anesthesia Start: 1306 Anesthesia Stop: 1331    Procedures:       COLONOSCOPY      ESOPHAGOGASTRODUODENOSCOPY Diagnosis:       Upper abdominal pain      Justice syndrome      Right upper quadrant pain      Generalized postprandial abdominal pain      Family history of malignant neoplasm of gastrointestinal tract      Encounter for screening colonoscopy      (Upper abdominal pain [R10.10])      (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])      (Right upper quadrant pain [R10.11])      (Generalized postprandial abdominal pain [R10.84])      (Family history of malignant neoplasm of gastrointestinal tract [Z80.0])      (Encounter for screening colonoscopy [Z12.11])    Surgeons: Mathieu Abreu MD Responsible Provider: MALIA King MD    Anesthesia Type: MAC ASA Status: 2            Anesthesia Type: MAC    Darlyn Phase I: Darlyn Score: 10    Darlyn Phase II:      Anesthesia Post Evaluation    Patient location during evaluation: bedside  Patient participation: complete - patient participated  Level of consciousness: responsive to verbal stimuli and awake and alert  Pain score: 2  Nausea & Vomiting: no nausea  Cardiovascular status: blood pressure returned to baseline  Respiratory status: acceptable  Hydration status: euvolemic  Multimodal analgesia pain management approach  Pain management: adequate    No notable events documented.

## 2024-11-07 ENCOUNTER — TELEMEDICINE (OUTPATIENT)
Age: 29
End: 2024-11-07
Payer: MEDICAID

## 2024-11-07 DIAGNOSIS — G93.2 IDIOPATHIC INTRACRANIAL HYPERTENSION: Primary | ICD-10-CM

## 2024-11-07 DIAGNOSIS — G93.2 IDIOPATHIC INTRACRANIAL HYPERTENSION: ICD-10-CM

## 2024-11-07 PROCEDURE — 99213 OFFICE O/P EST LOW 20 MIN: CPT | Performed by: NURSE PRACTITIONER

## 2024-11-07 RX ORDER — GABAPENTIN 300 MG/1
CAPSULE ORAL
COMMUNITY
Start: 2024-11-05

## 2024-11-07 RX ORDER — NORETHINDRONE 5 MG/1
TABLET ORAL
COMMUNITY

## 2024-11-07 RX ORDER — ACETAZOLAMIDE 250 MG/1
250 TABLET ORAL 2 TIMES DAILY
Qty: 180 TABLET | Refills: 3 | Status: SHIPPED | OUTPATIENT
Start: 2024-11-07

## 2024-11-07 RX ORDER — HYDROXYZINE HYDROCHLORIDE 25 MG/1
25 TABLET, FILM COATED ORAL EVERY 8 HOURS PRN
COMMUNITY
Start: 2024-11-05

## 2024-11-07 NOTE — PROGRESS NOTES
[facility]  [place] MEDICAL/EMERGENCY CENTER  NEUROLOGY CLINIC   [address] [place] [address]   [phone] Office   [phone] Fax           Date:  24     Name:  ALLISON SINGER  :  1995  MRN:  [id number]     PCP:  Lindsey, Pcp    Allison Singer is a 29 y.o. female who was seen by synchronous (real-time) audio-video technology on 2024 for IIH    Subjective:   Restarted the Diamox and is back to baseline. The bitemporal headaches and pain at the base of her neck, left sided numbness, dizziness and nausea, have all resolved. She did not call Dr. Kim's office as she forgot but will call to set up visual field testing. 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     Current Outpatient Medications   Medication Sig    gabapentin (NEURONTIN) 300 MG capsule     hydrOXYzine HCl (ATARAX) 25 MG tablet Take 1 tablet by mouth every 8 hours as needed for Anxiety    norethindrone (AYGESTIN) 5 MG tablet tablet    omeprazole (PRILOSEC) 20 MG delayed release capsule Take 1 capsule by mouth Daily    acetaZOLAMIDE (DIAMOX) 250 MG tablet Take 1 tablet by mouth 2 times daily    acetaminophen (TYLENOL) 325 MG tablet Take by mouth every 4 hours as needed    DULoxetine (CYMBALTA) 60 MG extended release capsule Take 1 capsule by mouth daily    ondansetron (ZOFRAN-ODT) 8 MG TBDP disintegrating tablet Take 1 tablet by mouth every 8 hours as needed     No current facility-administered medications for this visit.     Allergies   Allergen Reactions    Macadamia Nut Oil Swelling    Pecan Extract Swelling     Itching, numbness inside the mouth, swelling of the face      Past Medical History:   Diagnosis Date    Anxiety disorder     Asthma     Depression     Headache     Intracranial hypertension     Phlebitis and thrombophlebitis     vulvar varicosity     Past Surgical History:

## 2024-12-03 ENCOUNTER — TELEMEDICINE (OUTPATIENT)
Age: 29
End: 2024-12-03
Payer: MEDICAID

## 2024-12-03 DIAGNOSIS — F41.9 ANXIETY: ICD-10-CM

## 2024-12-03 DIAGNOSIS — E55.9 VITAMIN D DEFICIENCY: ICD-10-CM

## 2024-12-03 DIAGNOSIS — N92.0 SPOTTING BETWEEN MENSES: ICD-10-CM

## 2024-12-03 DIAGNOSIS — Z13.1 SCREENING FOR DIABETES MELLITUS: ICD-10-CM

## 2024-12-03 DIAGNOSIS — K58.9 IRRITABLE BOWEL SYNDROME, UNSPECIFIED TYPE: ICD-10-CM

## 2024-12-03 DIAGNOSIS — Z76.89 ENCOUNTER TO ESTABLISH CARE: Primary | ICD-10-CM

## 2024-12-03 DIAGNOSIS — Z76.89 ENCOUNTER TO ESTABLISH CARE: ICD-10-CM

## 2024-12-03 DIAGNOSIS — N89.8 VAGINAL ITCHING: ICD-10-CM

## 2024-12-03 DIAGNOSIS — Z13.220 SCREENING FOR HYPERLIPIDEMIA: ICD-10-CM

## 2024-12-03 DIAGNOSIS — K29.50 CHRONIC GASTRITIS WITHOUT BLEEDING, UNSPECIFIED GASTRITIS TYPE: ICD-10-CM

## 2024-12-03 DIAGNOSIS — Z11.59 NEED FOR HEPATITIS C SCREENING TEST: ICD-10-CM

## 2024-12-03 DIAGNOSIS — G93.2 IDIOPATHIC INTRACRANIAL HYPERTENSION: ICD-10-CM

## 2024-12-03 PROCEDURE — 99204 OFFICE O/P NEW MOD 45 MIN: CPT | Performed by: CLINICAL NURSE SPECIALIST

## 2024-12-03 RX ORDER — DICYCLOMINE HCL 20 MG
20 TABLET ORAL 4 TIMES DAILY
Qty: 120 TABLET | Refills: 2 | Status: SHIPPED | OUTPATIENT
Start: 2024-12-03 | End: 2025-03-03

## 2024-12-03 RX ORDER — ONDANSETRON 8 MG/1
8 TABLET, ORALLY DISINTEGRATING ORAL EVERY 8 HOURS PRN
Qty: 21 TABLET | Refills: 3 | Status: SHIPPED | OUTPATIENT
Start: 2024-12-03 | End: 2025-01-02

## 2024-12-03 RX ORDER — BUSPIRONE HYDROCHLORIDE 5 MG/1
5 TABLET ORAL 2 TIMES DAILY
Qty: 60 TABLET | Refills: 2 | Status: SHIPPED | OUTPATIENT
Start: 2024-12-03 | End: 2025-03-03

## 2024-12-03 SDOH — ECONOMIC STABILITY: FOOD INSECURITY: WITHIN THE PAST 12 MONTHS, THE FOOD YOU BOUGHT JUST DIDN'T LAST AND YOU DIDN'T HAVE MONEY TO GET MORE.: NEVER TRUE

## 2024-12-03 SDOH — ECONOMIC STABILITY: INCOME INSECURITY: HOW HARD IS IT FOR YOU TO PAY FOR THE VERY BASICS LIKE FOOD, HOUSING, MEDICAL CARE, AND HEATING?: NOT VERY HARD

## 2024-12-03 SDOH — ECONOMIC STABILITY: FOOD INSECURITY: WITHIN THE PAST 12 MONTHS, YOU WORRIED THAT YOUR FOOD WOULD RUN OUT BEFORE YOU GOT MONEY TO BUY MORE.: NEVER TRUE

## 2024-12-03 SDOH — HEALTH STABILITY: PHYSICAL HEALTH: ON AVERAGE, HOW MANY DAYS PER WEEK DO YOU ENGAGE IN MODERATE TO STRENUOUS EXERCISE (LIKE A BRISK WALK)?: 3 DAYS

## 2024-12-03 SDOH — HEALTH STABILITY: PHYSICAL HEALTH: ON AVERAGE, HOW MANY MINUTES DO YOU ENGAGE IN EXERCISE AT THIS LEVEL?: 60 MIN

## 2024-12-03 ASSESSMENT — PATIENT HEALTH QUESTIONNAIRE - PHQ9
2. FEELING DOWN, DEPRESSED OR HOPELESS: NOT AT ALL
SUM OF ALL RESPONSES TO PHQ QUESTIONS 1-9: 0
SUM OF ALL RESPONSES TO PHQ9 QUESTIONS 1 & 2: 0
1. LITTLE INTEREST OR PLEASURE IN DOING THINGS: NOT AT ALL

## 2024-12-03 ASSESSMENT — ENCOUNTER SYMPTOMS: SHORTNESS OF BREATH: 0

## 2024-12-03 NOTE — PROGRESS NOTES
Chief Complaint   Patient presents with    New Patient     \"Have you been to the ER, urgent care clinic since your last visit?  Hospitalized since your last visit?\"    NO    “Have you seen or consulted any other health care providers outside our system since your last visit?”    NO     “Have you had a pap smear?”    YES - Where:  Nurse/CMA to request most recent records if not in the chart    No cervical cancer screening on file     11/3/24 LMP           
a normal Pap smear after this.  Finally, she has been experiencing vaginal itching, does have a history of BV however this feels different.  She denies any malodor or thin/gray discharge.      Review of Systems   Constitutional:  Negative for fatigue and fever.   Respiratory:  Negative for shortness of breath.    Cardiovascular:  Negative for chest pain.   Genitourinary:  Positive for vaginal bleeding.   Neurological:  Negative for dizziness and headaches.   Psychiatric/Behavioral:  The patient is nervous/anxious.         Current Outpatient Medications on File Prior to Visit   Medication Sig Dispense Refill    omeprazole (PRILOSEC) 20 MG delayed release capsule Take 1 capsule by mouth Daily      acetaZOLAMIDE (DIAMOX) 250 MG tablet Take 1 tablet by mouth 2 times daily 180 tablet 3    DULoxetine (CYMBALTA) 60 MG extended release capsule Take 1 capsule by mouth daily       No current facility-administered medications on file prior to visit.       Past Medical History:   Diagnosis Date    Anxiety disorder     Asthma     Depression     Headache     Intracranial hypertension     Phlebitis and thrombophlebitis     vulvar varicosity     Past Surgical History:   Procedure Laterality Date    COLONOSCOPY      COLONOSCOPY N/A 11/4/2024    COLONOSCOPY performed by Mathieu Abreu MD at [facility] ENDOSCOPY    KELOID EXCISION      keloid x3 removal on ear    UPPER GASTROINTESTINAL ENDOSCOPY N/A 11/4/2024    ESOPHAGOGASTRODUODENOSCOPY performed by Mathieu Abreu MD at [facility] ENDOSCOPY     Family History   Problem Relation Age of Onset    Migraines Mother     Other Father         trigeminal neuralgia    Elevated Lipids Father     Migraines Sister     Colon Polyps Sister     Cancer Maternal Grandmother         gallbladder    Hypothyroidism Maternal Grandmother     Hypertension Maternal Grandmother     Diabetes Paternal Grandfather     Colon Cancer Paternal Grandfather     Colon Cancer Paternal Aunt     Colon Cancer

## 2024-12-14 ENCOUNTER — TELEPHONE (OUTPATIENT)
Age: 29
End: 2024-12-14

## 2024-12-14 RX ORDER — NITROFURANTOIN 25; 75 MG/1; MG/1
100 CAPSULE ORAL 2 TIMES DAILY
Qty: 10 CAPSULE | Refills: 0 | Status: SHIPPED | OUTPATIENT
Start: 2024-12-14 | End: 2024-12-19

## 2024-12-14 RX ORDER — FLUCONAZOLE 150 MG/1
150 TABLET ORAL ONCE
Qty: 1 TABLET | Refills: 0 | Status: SHIPPED | OUTPATIENT
Start: 2024-12-14 | End: 2024-12-14

## 2024-12-17 NOTE — TELEPHONE ENCOUNTER
C/o dysuria, frequency, and urgency. Macrobid sent in. Advised on use and potential side effects. She will advise if symptoms should persist in which case we will obtain urine culture.

## 2024-12-19 ENCOUNTER — TELEMEDICINE (OUTPATIENT)
Age: 29
End: 2024-12-19
Payer: MEDICAID

## 2024-12-19 DIAGNOSIS — K29.50 CHRONIC GASTRITIS WITHOUT BLEEDING, UNSPECIFIED GASTRITIS TYPE: ICD-10-CM

## 2024-12-19 DIAGNOSIS — A04.8 H. PYLORI INFECTION: Primary | ICD-10-CM

## 2024-12-19 PROCEDURE — 99214 OFFICE O/P EST MOD 30 MIN: CPT | Performed by: CLINICAL NURSE SPECIALIST

## 2024-12-19 RX ORDER — LACTOBACILLUS RHAMNOSUS GG 10B CELL
1 CAPSULE ORAL DAILY
Qty: 30 CAPSULE | Refills: 0 | Status: SHIPPED | OUTPATIENT
Start: 2024-12-19 | End: 2025-01-18

## 2024-12-19 RX ORDER — BISMUTH SUBCITRATE POTASSIUM, METRONIDAZOLE, TETRACYCLINE HYDROCHLORIDE 140; 125; 125 MG/1; MG/1; MG/1
3 CAPSULE ORAL
Qty: 168 CAPSULE | Refills: 0 | Status: SHIPPED | OUTPATIENT
Start: 2024-12-19 | End: 2025-01-02

## 2024-12-19 ASSESSMENT — ENCOUNTER SYMPTOMS
DIARRHEA: 0
NAUSEA: 1
BLOOD IN STOOL: 0
ABDOMINAL PAIN: 1
SHORTNESS OF BREATH: 0
VOMITING: 0
CONSTIPATION: 0

## 2024-12-19 NOTE — PROGRESS NOTES
Chief Complaint   Patient presents with    Abdominal Pain     \"Have you been to the ER, urgent care clinic since your last visit?  Hospitalized since your last visit?\"    NO    “Have you seen or consulted any other health care providers outside our system since your last visit?”    NO

## 2024-12-19 NOTE — PROGRESS NOTES
Govind Singer, was evaluated through a synchronous (real-time) audio-video encounter. The patient (or guardian if applicable) is aware that this is a billable service, which includes applicable co-pays. This Virtual Visit was conducted with patient's (and/or legal guardian's) consent. Patient identification was verified, and a caregiver was present when appropriate.   The patient was located at Home: [id number]  [place] [address]  Provider was located at Facility (Appt Dept): [address]  [address]  [place] [address]  Confirm you are appropriately licensed, registered, or certified to deliver care in the state where the patient is located as indicated above. If you are not or unsure, please re-schedule the visit: Yes, I confirm.     Govind Singer (:  1995) is a Established patient, presenting virtually for evaluation of the following:      Below is the assessment and plan developed based on review of pertinent history, physical exam, labs, studies, and medications.     Assessment & Plan  H. pylori infection    Will treat with below regimen x 14 days, reinforced need to complete medication in its entirety. Advised of need for repeat H. Pylori test 4 to 6 weeks following completion of treatment.     Orders:    bismuth-metroNIDAZOLE-tetracycline (PLYERA) 140-125-125 MG per capsule; Take 3 capsules by mouth 4 times daily (before meals and nightly) for 14 days    lactobacillus (CULTURELLE) CAPS capsule; Take 1 capsule by mouth daily    Chronic gastritis without bleeding, unspecified gastritis type    Suspect that she will have some relief of symptoms once H. Pylori eradicated. Will likely need to continue PPI long term. Continue ondansetron PRN.     Orders:    lactobacillus (CULTURELLE) CAPS capsule; Take 1 capsule by mouth daily    Encouraged to call with any questions or concerns.   Return in about 8 weeks (around 2025) for h pylor follow up .       Subjective

## 2024-12-22 RX ORDER — VALACYCLOVIR HYDROCHLORIDE 1 G/1
TABLET, FILM COATED ORAL
Qty: 90 TABLET | Refills: 1 | Status: SHIPPED | OUTPATIENT
Start: 2024-12-22

## 2024-12-26 DIAGNOSIS — F41.9 ANXIETY: ICD-10-CM

## 2024-12-26 RX ORDER — BUSPIRONE HYDROCHLORIDE 5 MG/1
5 TABLET ORAL 2 TIMES DAILY
Qty: 180 TABLET | Refills: 1 | Status: SHIPPED | OUTPATIENT
Start: 2024-12-26

## 2024-12-31 RX ORDER — CLARITHROMYCIN 500 MG/1
500 TABLET ORAL 2 TIMES DAILY
Qty: 28 TABLET | Refills: 0 | Status: SHIPPED | OUTPATIENT
Start: 2024-12-31 | End: 2025-01-14

## 2024-12-31 RX ORDER — AMOXICILLIN 500 MG/1
1000 CAPSULE ORAL 2 TIMES DAILY
Qty: 56 CAPSULE | Refills: 0 | Status: SHIPPED | OUTPATIENT
Start: 2024-12-31 | End: 2025-01-14

## 2025-02-06 ENCOUNTER — TELEMEDICINE (OUTPATIENT)
Age: 30
End: 2025-02-06
Payer: MEDICAID

## 2025-02-06 DIAGNOSIS — R61 NIGHT SWEATS: ICD-10-CM

## 2025-02-06 DIAGNOSIS — A04.8 H. PYLORI INFECTION: Primary | ICD-10-CM

## 2025-02-06 DIAGNOSIS — R10.11 RIGHT UPPER QUADRANT PAIN: ICD-10-CM

## 2025-02-06 DIAGNOSIS — A04.8 H. PYLORI INFECTION: ICD-10-CM

## 2025-02-06 PROCEDURE — 99214 OFFICE O/P EST MOD 30 MIN: CPT | Performed by: CLINICAL NURSE SPECIALIST

## 2025-02-06 RX ORDER — ONDANSETRON 8 MG/1
8 TABLET, ORALLY DISINTEGRATING ORAL EVERY 8 HOURS PRN
COMMUNITY
Start: 2025-01-30

## 2025-02-06 SDOH — ECONOMIC STABILITY: FOOD INSECURITY: WITHIN THE PAST 12 MONTHS, YOU WORRIED THAT YOUR FOOD WOULD RUN OUT BEFORE YOU GOT MONEY TO BUY MORE.: NEVER TRUE

## 2025-02-06 SDOH — ECONOMIC STABILITY: FOOD INSECURITY: WITHIN THE PAST 12 MONTHS, THE FOOD YOU BOUGHT JUST DIDN'T LAST AND YOU DIDN'T HAVE MONEY TO GET MORE.: NEVER TRUE

## 2025-02-06 ASSESSMENT — PATIENT HEALTH QUESTIONNAIRE - PHQ9
SUM OF ALL RESPONSES TO PHQ QUESTIONS 1-9: 0
SUM OF ALL RESPONSES TO PHQ9 QUESTIONS 1 & 2: 0
SUM OF ALL RESPONSES TO PHQ QUESTIONS 1-9: 0
1. LITTLE INTEREST OR PLEASURE IN DOING THINGS: NOT AT ALL
SUM OF ALL RESPONSES TO PHQ QUESTIONS 1-9: 0
2. FEELING DOWN, DEPRESSED OR HOPELESS: NOT AT ALL
SUM OF ALL RESPONSES TO PHQ QUESTIONS 1-9: 0

## 2025-02-06 ASSESSMENT — ENCOUNTER SYMPTOMS
DIARRHEA: 0
NAUSEA: 1
ABDOMINAL PAIN: 1
VOMITING: 0
SHORTNESS OF BREATH: 0
BLOOD IN STOOL: 0

## 2025-02-06 NOTE — PROGRESS NOTES
Govind Singer, was evaluated through a synchronous (real-time) audio-video encounter. The patient (or guardian if applicable) is aware that this is a billable service, which includes applicable co-pays. This Virtual Visit was conducted with patient's (and/or legal guardian's) consent. Patient identification was verified, and a caregiver was present when appropriate.   The patient was located at Home: [id number]  [place] [address]  Provider was located at Facility (Appt Dept): [address]  [address]  [place] [address]  Confirm you are appropriately licensed, registered, or certified to deliver care in the state where the patient is located as indicated above. If you are not or unsure, please re-schedule the visit: Yes, I confirm.     Govind Singer (:  1995) is a Established patient, presenting virtually for evaluation of the following:      Below is the assessment and plan developed based on review of pertinent history, physical exam, labs, studies, and medications.     Assessment & Plan  H. pylori infection    Unable to obtain breath test d/t continued use of PPI. Will obtain stool antigen to ensure eradication.     Orders:    H. Pylori Antigen, Stool; Future    Night sweats    Will rule out anemia, thyroid abnormality, diabetes. If labs unremarkable, may need to consider CT of chest/abdomen and pelvis.    Orders:    HIV 1/2 Ag/Ab, 4TH Generation,W Rflx Confirm; Future    C-Reactive Protein; Future    Right upper quadrant pain    Will obtain imaging to r/o cholelithiasis. Apply heat intermittently as tolerated.     Orders:    US GALLBLADDER RUQ; Future    Will follow up pending test results and discuss any additional plan of care.  Encouraged to call with any questions or concerns.   Return if symptoms worsen or fail to improve.       Luis Faust presents for a virtual visit. States that she is generally doing alright. Reports that she has been dealing with

## 2025-02-11 RX ORDER — FLUCONAZOLE 150 MG/1
150 TABLET ORAL DAILY
Qty: 7 TABLET | Refills: 0 | Status: SHIPPED | OUTPATIENT
Start: 2025-02-11 | End: 2025-02-18

## 2025-02-21 ENCOUNTER — TELEMEDICINE (OUTPATIENT)
Age: 30
End: 2025-02-21

## 2025-02-21 DIAGNOSIS — J03.90 TONSILLITIS: Primary | ICD-10-CM

## 2025-02-21 RX ORDER — FLUCONAZOLE 150 MG/1
150 TABLET ORAL ONCE
Qty: 1 TABLET | Refills: 0 | Status: SHIPPED | OUTPATIENT
Start: 2025-02-21 | End: 2025-02-21

## 2025-02-21 RX ORDER — AMOXICILLIN 500 MG/1
1000 TABLET, FILM COATED ORAL 2 TIMES DAILY
Qty: 40 TABLET | Refills: 0 | Status: SHIPPED | OUTPATIENT
Start: 2025-02-21 | End: 2025-03-03

## 2025-02-21 ASSESSMENT — ENCOUNTER SYMPTOMS
SORE THROAT: 1
NAUSEA: 1
VOMITING: 0
COUGH: 0

## 2025-02-21 NOTE — PROGRESS NOTES
Govind Singer, was evaluated through a synchronous (real-time) audio-video encounter. The patient (or guardian if applicable) is aware that this is a billable service, which includes applicable co-pays. This Virtual Visit was conducted with patient's (and/or legal guardian's) consent. Patient identification was verified, and a caregiver was present when appropriate.   The patient was located at Home: [address]  [place] [address]  Provider was located at Facility (Appt Dept): [address]  [address]  [place] [address]  Confirm you are appropriately licensed, registered, or certified to deliver care in the state where the patient is located as indicated above. If you are not or unsure, please re-schedule the visit: Yes, I confirm.     Govind Singer (:  1995) is a Established patient, presenting virtually for evaluation of the following:      Below is the assessment and plan developed based on review of pertinent history, physical exam, labs, studies, and medications.     Assessment & Plan  Tonsillitis    Start augmentin BID, advised on use and potential side effects. Diflucan sent to pharmacy d/t hx of yeast after abx use. Can gargle warm salt water as tolerated. Continue OTC ibuprofen as needed for pain and fever.       Encouraged to call with any questions or concerns.   Return if symptoms worsen or fail to improve.       Subjective   Deronica presents for a problem visit. Over the course of three days she has been dealing with a sore throat, fever, chills, and nausea. Has noted thick, yellow pus coming from her throat. Has been taking ibuprofen over the counter with some relief.       Review of Systems   Constitutional:  Positive for chills and fever.   HENT:  Positive for sore throat.    Respiratory:  Negative for cough.    Cardiovascular:  Negative for chest pain.   Gastrointestinal:  Positive for nausea. Negative for vomiting.        Current Outpatient

## 2025-02-21 NOTE — PROGRESS NOTES
Chief Complaint   Patient presents with    Fever    Chills    Pharyngitis     \"Have you been to the ER, urgent care clinic since your last visit?  Hospitalized since your last visit?\"    NO    “Have you seen or consulted any other health care providers outside our system since your last visit?”    NO

## 2025-02-24 RX ORDER — PHENIRAMINE/PHENYLEPHRINE/DM 20-10-20MG
1 POWDER IN PACKET (EA) ORAL EVERY 6 HOURS PRN
Qty: 6 EACH | Refills: 1 | Status: SHIPPED | OUTPATIENT
Start: 2025-02-24 | End: 2025-03-01

## 2025-02-24 RX ORDER — METHYLPREDNISOLONE 4 MG/1
TABLET ORAL
Qty: 1 KIT | Refills: 0 | Status: SHIPPED | OUTPATIENT
Start: 2025-02-24 | End: 2025-03-02

## 2025-03-28 RX ORDER — DULOXETIN HYDROCHLORIDE 60 MG/1
60 CAPSULE, DELAYED RELEASE ORAL 2 TIMES DAILY
Qty: 60 CAPSULE | Refills: 5 | Status: SHIPPED | OUTPATIENT
Start: 2025-03-28

## 2025-05-31 DIAGNOSIS — K29.50 CHRONIC GASTRITIS WITHOUT BLEEDING, UNSPECIFIED GASTRITIS TYPE: ICD-10-CM

## 2025-06-03 RX ORDER — DICYCLOMINE HCL 20 MG
20 TABLET ORAL 4 TIMES DAILY
Qty: 120 TABLET | Refills: 2 | Status: SHIPPED | OUTPATIENT
Start: 2025-06-03

## 2025-08-18 ENCOUNTER — TELEMEDICINE (OUTPATIENT)
Age: 30
End: 2025-08-18
Payer: MEDICAID

## 2025-08-18 DIAGNOSIS — G93.2 IDIOPATHIC INTRACRANIAL HYPERTENSION: ICD-10-CM

## 2025-08-18 DIAGNOSIS — R21 RASH AND NONSPECIFIC SKIN ERUPTION: Primary | ICD-10-CM

## 2025-08-18 DIAGNOSIS — B37.2 CANDIDAL INTERTRIGO: ICD-10-CM

## 2025-08-18 DIAGNOSIS — F41.9 ANXIETY: ICD-10-CM

## 2025-08-18 PROCEDURE — 99213 OFFICE O/P EST LOW 20 MIN: CPT | Performed by: CLINICAL NURSE SPECIALIST

## 2025-08-18 RX ORDER — ACETAZOLAMIDE 250 MG/1
TABLET ORAL
Qty: 180 TABLET | Refills: 3 | Status: SHIPPED
Start: 2025-08-18

## 2025-08-18 RX ORDER — DULOXETIN HYDROCHLORIDE 60 MG/1
60 CAPSULE, DELAYED RELEASE ORAL 2 TIMES DAILY
Qty: 180 CAPSULE | Refills: 1 | Status: SHIPPED | OUTPATIENT
Start: 2025-08-18 | End: 2026-02-14

## 2025-08-18 RX ORDER — NYSTATIN 100000 [USP'U]/G
POWDER TOPICAL
Qty: 30 G | Refills: 1 | Status: SHIPPED | OUTPATIENT
Start: 2025-08-18

## 2025-08-18 RX ORDER — ACETAZOLAMIDE 250 MG/1
750 TABLET ORAL DAILY
Qty: 180 TABLET | Refills: 3 | Status: SHIPPED
Start: 2025-08-18 | End: 2025-08-18

## 2025-08-18 ASSESSMENT — ENCOUNTER SYMPTOMS: RESPIRATORY NEGATIVE: 1

## 2025-08-30 ENCOUNTER — TELEPHONE (OUTPATIENT)
Age: 30
End: 2025-08-30

## 2025-08-30 DIAGNOSIS — K29.50 CHRONIC GASTRITIS WITHOUT BLEEDING, UNSPECIFIED GASTRITIS TYPE: Primary | ICD-10-CM

## 2025-08-30 RX ORDER — ONDANSETRON 4 MG/1
4 TABLET, ORALLY DISINTEGRATING ORAL 3 TIMES DAILY PRN
Qty: 21 TABLET | Refills: 2 | Status: SHIPPED | OUTPATIENT
Start: 2025-08-30

## (undated) DEVICE — SET GRAV CK VLV NEEDLESS ST 3 GANGED 4WAY STPCOCK HI FLO 10

## (undated) DEVICE — TRAP ENDOSCP POLYP 2 CHMBR DRAWER TYP

## (undated) DEVICE — CUFF BLD PRSS AD CLTH SGL TB W/ BAYNT CONN ROUNDED CORNER

## (undated) DEVICE — TIP SUCT TRNSPAR RIB SURF STD BLB RIG NVENT W/ 5IN1 CONN DYND50138] MEDLINE INDUSTRIES INC]

## (undated) DEVICE — SNARE ENDOSCP POLYP MED STD AD 2.4X27X240 CM 2.8 MM OVL SENS

## (undated) DEVICE — CONTAINER SPEC 20 ML LID NEUT BUFF FORMALIN 10 % POLYPR STS

## (undated) DEVICE — IV START KIT: Brand: MEDLINE

## (undated) DEVICE — ENDOSCOPIC KIT COMPLIANCE ENDOKIT

## (undated) DEVICE — FORCEPS BX L240CM JAW DIA2.4MM ORNG L CAP W/ NDL DISP RAD